# Patient Record
Sex: FEMALE
[De-identification: names, ages, dates, MRNs, and addresses within clinical notes are randomized per-mention and may not be internally consistent; named-entity substitution may affect disease eponyms.]

---

## 2019-01-04 ENCOUNTER — HOSPITAL ENCOUNTER (INPATIENT)
Dept: HOSPITAL 31 - C.ER | Age: 64
LOS: 6 days | Discharge: HOME | DRG: 190 | End: 2019-01-10
Attending: INTERNAL MEDICINE | Admitting: INTERNAL MEDICINE
Payer: MEDICARE

## 2019-01-04 DIAGNOSIS — J18.9: ICD-10-CM

## 2019-01-04 DIAGNOSIS — J20.9: ICD-10-CM

## 2019-01-04 DIAGNOSIS — J44.1: ICD-10-CM

## 2019-01-04 DIAGNOSIS — E11.9: ICD-10-CM

## 2019-01-04 DIAGNOSIS — F17.210: ICD-10-CM

## 2019-01-04 DIAGNOSIS — J44.0: Primary | ICD-10-CM

## 2019-01-04 DIAGNOSIS — I10: ICD-10-CM

## 2019-01-04 DIAGNOSIS — J45.901: ICD-10-CM

## 2019-01-04 LAB
ALBUMIN SERPL-MCNC: 3.9 G/DL (ref 3.5–5)
ALBUMIN/GLOB SERPL: 1.2 {RATIO} (ref 1–2.1)
ALT SERPL-CCNC: 21 U/L (ref 9–52)
APTT BLD: 47 SECONDS (ref 21–34)
AST SERPL-CCNC: 19 U/L (ref 14–36)
BASE EXCESS BLDV CALC-SCNC: -6.2 MMOL/L (ref 0–2)
BASOPHILS # BLD AUTO: 0.1 K/UL (ref 0–0.2)
BASOPHILS NFR BLD: 0.4 % (ref 0–2)
BNP SERPL-MCNC: 258 PG/ML (ref 0–900)
BUN SERPL-MCNC: 17 MG/DL (ref 7–17)
CALCIUM SERPL-MCNC: 8.8 MG/DL (ref 8.6–10.4)
EOSINOPHIL # BLD AUTO: 0 K/UL (ref 0–0.7)
EOSINOPHIL NFR BLD: 0.1 % (ref 0–4)
ERYTHROCYTE [DISTWIDTH] IN BLOOD BY AUTOMATED COUNT: 13.6 % (ref 11.5–14.5)
GFR NON-AFRICAN AMERICAN: > 60
HGB BLD-MCNC: 14.9 G/DL (ref 11–16)
INR PPP: 1.4
LYMPHOCYTE: 9 % (ref 20–40)
LYMPHOCYTES # BLD AUTO: 0.8 K/UL (ref 1–4.3)
LYMPHOCYTES NFR BLD AUTO: 5.2 % (ref 20–40)
MCH RBC QN AUTO: 28.6 PG (ref 27–31)
MCHC RBC AUTO-ENTMCNC: 33.2 G/DL (ref 33–37)
MCV RBC AUTO: 86.3 FL (ref 81–99)
MONOCYTE: 4 % (ref 0–10)
MONOCYTES # BLD: 1.2 K/UL (ref 0–0.8)
MONOCYTES NFR BLD: 7.5 % (ref 0–10)
NEUTROPHILS # BLD: 13.6 K/UL (ref 1.8–7)
NEUTROPHILS NFR BLD AUTO: 84 % (ref 50–75)
NEUTROPHILS NFR BLD AUTO: 86.8 % (ref 50–75)
NEUTS BAND NFR BLD: 3 % (ref 0–2)
NRBC BLD AUTO-RTO: 0 % (ref 0–2)
PCO2 BLDV: 30 MMHG (ref 40–60)
PH BLDV: 7.38 [PH] (ref 7.32–7.43)
PLATELET # BLD EST: NORMAL 10*3/UL
PLATELET # BLD: 205 K/UL (ref 130–400)
PMV BLD AUTO: 9.7 FL (ref 7.2–11.7)
PROTHROMBIN TIME: 15.1 SECONDS (ref 9.7–12.2)
RBC # BLD AUTO: 5.19 MIL/UL (ref 3.8–5.2)
TOTAL CELLS COUNTED BLD: 100
VENOUS BLOOD FIO2: 21 %
VENOUS BLOOD GAS PO2: 47 MM/HG (ref 30–55)
WBC # BLD AUTO: 15.7 K/UL (ref 4.8–10.8)

## 2019-01-04 RX ADMIN — METHYLPREDNISOLONE SODIUM SUCCINATE SCH MG: 40 INJECTION, POWDER, FOR SOLUTION INTRAMUSCULAR; INTRAVENOUS at 23:47

## 2019-01-04 NOTE — RAD
HISTORY:

 SOB 



COMPARISON:

Chest x-ray performed 3/30/15 



TECHNIQUE:

Chest, one view.



FINDINGS:





LUNGS:

Patchy right greater than left lower lobe infiltrates.



Please note that chest x-ray has limited sensitivity for the 

detection of pulmonary masses.



PLEURA:

No significant pleural effusion identified. No definite pneumothorax .



CARDIOVASCULAR:

Heart size appears within normal limits.  Atherosclerotic 

calcifications present. 



OSSEOUS STRUCTURES:

Degenerative changes.



VISUALIZED UPPER ABDOMEN:

Unremarkable.



OTHER FINDINGS:

None.



IMPRESSION:

Patchy right greater than left lower lobe infiltrate.

## 2019-01-04 NOTE — C.PDOC
History Of Present Illness


63 year old female presents to the ED c/o fever, cough, congestion and dyspnea 

on exertion for the past 2-3 days. Patient also reports having decreased 

appetite able to speak in complete sentences. Patient states she still smokes 

3/4 pack a day. Patient denies rash, headache, nausea, vomit, diarrhea, CP, 

weakness, numbness.


Time Seen by Provider: 01/04/19 19:51


Chief Complaint (Nursing): Fever


History Per: Patient


History/Exam Limitations: no limitations


Onset/Duration Of Symptoms: Days (2-3)


Current Symptoms Are (Timing): Still Present


Location Of Pain: Sinus/es


Associated Symptoms: Fever, Cough, Sinus Drainage, Nasal Congestion


Severity: Moderate


Pain Scale Rating Of: 4


Recent travel outside of the [place]: No


Additional History Per: Patient





Past Medical History


Reviewed: Historical Data, Nursing Documentation, Vital Signs


Vital Signs: 





                                Last Vital Signs











Temp  100.9 F H  01/04/19 18:25


 


Pulse  98 H  01/04/19 18:25


 


Resp  24   01/04/19 18:25


 


BP  121/71   01/04/19 18:25


 


Pulse Ox  91 L  01/04/19 18:25














- Medical History


PMH: Asthma, HTN


Surgical History: No Surg Hx


Family History: States: Unknown Family Hx





- Social History


Hx Alcohol Use: No


Hx Substance Use: No





- Immunization History


Hx Tetanus Toxoid Vaccination: No


Hx Influenza Vaccination: No


Hx Pneumococcal Vaccination: No





Review Of Systems


Constitutional: Positive for: Fever.  Negative for: Chills


ENT: Positive for: Nose Discharge, Nose Congestion


Cardiovascular: Negative for: Chest Pain


Respiratory: Positive for: Cough, SOB with Excertion.  Negative for: Shortness 

of Breath


Gastrointestinal: Negative for: Nausea, Vomiting, Abdominal Pain, Diarrhea


Musculoskeletal: Negative for: Back Pain


Skin: Negative for: Rash


Neurological: Negative for: Weakness, Numbness, Headache, Dizziness


Psych: Negative for: Anxiety





Physical Exam





- Physical Exam


Appears: Non-toxic, No Acute Distress


Skin: Warm, Dry


Head: Normacephalic


Eye(s): bilateral: Normal Inspection


Oral Mucosa: Moist


Throat: No Erythema, No Exudate


Neck: Supple


Chest: Symmetrical


Cardiovascular: Rhythm Regular


Respiratory: Decreased Breath Sounds, No Rales, Rhonchi, Wheezing


Gastrointestinal/Abdominal: Soft, No Tenderness, No Guarding, No Rebound


Back: Normal Inspection


Extremity: Normal ROM


Extremity: Bilateral: Atraumatic, Normal Color And Temperature, Normal ROM


Neurological/Psych: Oriented x3, Normal Speech, Normal Cognition


Gait: Steady





ED Course And Treatment





- Laboratory Results


Result Diagrams: 


                                 01/04/19 20:33





                                 01/04/19 20:33


ECG: Interpreted By Me, Viewed By Me


ECG Rhythm: Sinus Rhythm (68), L BBB, Nonspecific Changes


O2 Sat by Pulse Oximetry: 91


Pulse Ox Interpretation: Abnormal


Interpretation Of Abnormal: placed on 2l nc





- Radiology


CXR: Interpreted by Me, Viewed By Me


CXR Interpretation: Yes: Infiltrates (rll?), COPD, Other (unchanged from 2015). 

No: Fracture, Pnemothorax


Progress Note: Plan:  - VBG.  - EKG.  - CXR.  - Labs.  - IV fluids.  - Tylenol 

975 mg PO.  - Blood culture.  - Influenza A B.  - UA





Disposition


Discussed With : Malachi Pena


Comment: accepted the pt on his service and took over the  care at 11:02PM


Doctor Will See Patient In The: Hospital


Counseled Patient/Family Regarding: Studies Performed, Diagnosis





- Disposition


Disposition: ELOPEMENT - ER ONLY


Disposition Time: 19:51


Condition: FAIR


Forms:  [facility] (English)





- POA


Present On Arrival: Poor Glycemic Control





- Clinical Impression


Clinical Impression: 


 Fever, Pneumonia








- Scribe Statement


The provider has reviewed the documentation as recorded by the Scribe


Anthony Steele





All medical record entries made by the Scribe were at my direction and 

personally dictated by me. I have reviewed the chart and agree that the record 

accurately reflects my personal performance of the history, physical exam, 

medical decision making, and the department course for this patient. I have also

personally directed, reviewed, and agree with the discharge instructions and 

disposition.





Decision To Admit





- Pt Status Changed To:


Hospital Disposition Of: Inpatient





- Admit Certification


Admit to Inpatient:: After my assessment, the patient will require 

hospitalization for at least two midnights.  This is because of the severity of 

symptoms shown, intensity of services needed, and/or the medical risk in this 

patient being treated as an outpatient.





- InPatient:


Physician Admission Certification:: After my assessment, the patient will 

require hospitalization for at least two midnights.  This is because of the 

severity of symptoms shown, intensity of services needed, and/or the medical 

risk in this patient being treated as an outpatient.





- .


Bed Request Type: Regular


Admitting Physician: Malachi Pena


Patient Diagnosis: 


 Fever, Pneumonia

## 2019-01-05 LAB
ALBUMIN SERPL-MCNC: 3.6 G/DL (ref 3.5–5)
ALBUMIN/GLOB SERPL: 1.1 {RATIO} (ref 1–2.1)
ALT SERPL-CCNC: 17 U/L (ref 9–52)
AST SERPL-CCNC: 30 U/L (ref 14–36)
BASOPHILS # BLD AUTO: 0 K/UL (ref 0–0.2)
BASOPHILS NFR BLD: 0.2 % (ref 0–2)
BILIRUB UR-MCNC: NEGATIVE MG/DL
BUN SERPL-MCNC: 17 MG/DL (ref 7–17)
BURR CELLS BLD QL SMEAR: SLIGHT
CALCIUM SERPL-MCNC: 8.6 MG/DL (ref 8.6–10.4)
EOSINOPHIL # BLD AUTO: 0 K/UL (ref 0–0.7)
EOSINOPHIL NFR BLD: 0.2 % (ref 0–4)
ERYTHROCYTE [DISTWIDTH] IN BLOOD BY AUTOMATED COUNT: 13.5 % (ref 11.5–14.5)
GFR NON-AFRICAN AMERICAN: > 60
GLUCOSE UR STRIP-MCNC: (no result) MG/DL
HGB BLD-MCNC: 14.5 G/DL (ref 11–16)
LEUKOCYTE ESTERASE UR-ACNC: (no result) LEU/UL
LG PLATELETS BLD QL SMEAR: PRESENT
LYMPHOCYTE: 3 % (ref 20–40)
LYMPHOCYTES # BLD AUTO: 0.6 K/UL (ref 1–4.3)
LYMPHOCYTES NFR BLD AUTO: 3.6 % (ref 20–40)
MCH RBC QN AUTO: 28.6 PG (ref 27–31)
MCHC RBC AUTO-ENTMCNC: 32.5 G/DL (ref 33–37)
MCV RBC AUTO: 88 FL (ref 81–99)
MONOCYTE: 1 % (ref 0–10)
MONOCYTES # BLD: 0.1 K/UL (ref 0–0.8)
MONOCYTES NFR BLD: 0.9 % (ref 0–10)
NEUTROPHILS # BLD: 15.4 K/UL (ref 1.8–7)
NEUTROPHILS NFR BLD AUTO: 87 % (ref 50–75)
NEUTROPHILS NFR BLD AUTO: 95.1 % (ref 50–75)
NEUTS BAND NFR BLD: 8 % (ref 0–2)
NRBC BLD AUTO-RTO: 0 % (ref 0–2)
PH UR STRIP: 5 [PH] (ref 5–8)
PLATELET # BLD EST: NORMAL 10*3/UL
PLATELET # BLD: 180 K/UL (ref 130–400)
PMV BLD AUTO: 9.6 FL (ref 7.2–11.7)
PROT UR STRIP-MCNC: NEGATIVE MG/DL
RBC # BLD AUTO: 5.07 MIL/UL (ref 3.8–5.2)
RBC # UR STRIP: NEGATIVE /UL
SP GR UR STRIP: 1.02 (ref 1–1.03)
TOTAL CELLS COUNTED BLD: 100
UROBILINOGEN UR-MCNC: NORMAL MG/DL (ref 0.2–1)
VARIANT LYMPHS NFR BLD MANUAL: 1 % (ref 0–0)
WBC # BLD AUTO: 16.1 K/UL (ref 4.8–10.8)

## 2019-01-05 RX ADMIN — HUMAN INSULIN SCH UNIT: 100 INJECTION, SOLUTION SUBCUTANEOUS at 21:47

## 2019-01-05 RX ADMIN — METHYLPREDNISOLONE SODIUM SUCCINATE SCH MG: 40 INJECTION, POWDER, FOR SOLUTION INTRAMUSCULAR; INTRAVENOUS at 23:11

## 2019-01-05 RX ADMIN — METHYLPREDNISOLONE SODIUM SUCCINATE SCH MG: 40 INJECTION, POWDER, FOR SOLUTION INTRAMUSCULAR; INTRAVENOUS at 08:30

## 2019-01-05 RX ADMIN — IPRATROPIUM BROMIDE AND ALBUTEROL SULFATE SCH: .5; 3 SOLUTION RESPIRATORY (INHALATION) at 21:09

## 2019-01-05 RX ADMIN — IPRATROPIUM BROMIDE AND ALBUTEROL SULFATE PRN ML: .5; 3 SOLUTION RESPIRATORY (INHALATION) at 05:22

## 2019-01-05 RX ADMIN — METHYLPREDNISOLONE SODIUM SUCCINATE SCH MG: 40 INJECTION, POWDER, FOR SOLUTION INTRAMUSCULAR; INTRAVENOUS at 16:08

## 2019-01-05 RX ADMIN — IPRATROPIUM BROMIDE AND ALBUTEROL SULFATE PRN ML: .5; 3 SOLUTION RESPIRATORY (INHALATION) at 12:37

## 2019-01-05 NOTE — CP.PCM.HP
History of Present Illness





- History of Present Illness


History of Present Illness: 





63 year old female presents to the ED c/o fever, cough, congestion and dyspnea 

on exertion for the past 2-3 days. Patient also reports having decreased 

appetite able to speak in complete sentences. Patient states she still smokes 

3/4 pack a day





Present on Admission





- Present on Admission


Any Indicators Present on Admission: No





Review of Systems





- Review of Systems


All systems: reviewed and no additional remarkable complaints except (COUGH 

WHEEZING SHORTNESS OF AND LOW-GRADE FEVER)





Past Patient History





- Past Social History


Smoking Status: Light Smoker < 10 Cigarettes Daily





- CARDIAC


Hx Hypertension: Yes





- PULMONARY


Hx Asthma: Yes





- ENDOCRINE/METABOLIC


Hx Endocrine Disorders: Yes


Hx Diabetes Mellitus Type 2: Yes





- PSYCHIATRIC


Hx Substance Use: No





- SURGICAL HISTORY


Hx Surgeries: Yes


Hx Cataract Extraction: Yes (left)





Meds


Allergies/Adverse Reactions: 


                                    Allergies











Allergy/AdvReac Type Severity Reaction Status Date / Time


 


No Known Allergies Allergy   Verified 01/04/19 19:18














Physical Exam





- Constitutional


Appears: Well





- Head Exam


Head Exam: ATRAUMATIC, NORMAL INSPECTION, NORMOCEPHALIC





- Eye Exam


Eye Exam: EOMI, Normal appearance, PERRL





- ENT Exam


ENT Exam: Mucous Membranes Moist, Normal Exam





- Neck Exam


Neck exam: Positive for: Normal Inspection





- Respiratory Exam


Respiratory Exam: Accessory Muscle Use, Prolonged Expiratory Phase, Rhonchi





- Cardiovascular Exam


Cardiovascular Exam: REGULAR RHYTHM





- GI/Abdominal Exam


GI & Abdominal Exam: Normal Bowel Sounds, Soft.  absent: Tenderness





- Extremities Exam


Extremities exam: Positive for: normal inspection





Results





- Vital Signs


Recent Vital Signs: 





                                Last Vital Signs











Temp  98.3 F   01/04/19 23:47


 


Pulse  88   01/05/19 12:05


 


Resp  16   01/05/19 12:05


 


BP  115/56 L  01/05/19 12:05


 


Pulse Ox  95   01/05/19 12:05














- Labs


Result Diagrams: 


                                 01/05/19 05:13





                                 01/05/19 05:42


Labs: 





                         Laboratory Results - last 24 hr











  01/04/19 01/04/19 01/04/19





  20:33 20:33 20:33


 


WBC  15.7 H  


 


RBC  5.19  


 


Hgb  14.9  


 


Hct  44.8  


 


MCV  86.3  


 


MCH  28.6  


 


MCHC  33.2  


 


RDW  13.6  


 


Plt Count  205  


 


MPV  9.7  


 


Neut % (Auto)  86.8 H  


 


Lymph % (Auto)  5.2 L  


 


Mono % (Auto)  7.5  


 


Eos % (Auto)  0.1  


 


Baso % (Auto)  0.4  


 


Neut # (Auto)  13.6 H  


 


Lymph # (Auto)  0.8 L  


 


Mono # (Auto)  1.2 H  


 


Eos # (Auto)  0.0  


 


Baso # (Auto)  0.1  


 


Neutrophils % (Manual)  84 H  


 


Band Neutrophils %  3 H  


 


Lymphocytes % (Manual)  9 L  


 


Reactive Lymphs %   


 


Monocytes % (Manual)  4  


 


Platelet Estimate  Normal  


 


Large Platelets   


 


Harjeet Cells   


 


PT   15.1 H 


 


INR   1.4 


 


APTT   47 H 


 


pO2   


 


VBG pH   


 


VBG pCO2   


 


VBG HCO3   


 


VBG Total CO2   


 


VBG O2 Sat (Calc)   


 


VBG Base Excess   


 


VBG Potassium   


 


A-a O2 Difference   


 


Glucose   


 


Lactate   


 


FiO2   


 


Crit Value Called To   


 


Crit Value Called By   


 


Crit Value Read Back   


 


Blood Gas Notified Time   


 


Sodium    134


 


Potassium    3.9


 


Chloride    99


 


Carbon Dioxide    25


 


Anion Gap    15


 


BUN    17


 


Creatinine    0.8


 


Est GFR ( Amer)    > 60


 


Est GFR (Non-Af Amer)    > 60


 


Random Glucose    154 H D


 


Calcium    8.8


 


Total Bilirubin    0.8


 


AST    19


 


ALT    21


 


Alkaline Phosphatase    111


 


Troponin I    < 0.0120


 


NT-Pro-B Natriuret Pep    258


 


Total Protein    7.1


 


Albumin    3.9


 


Globulin    3.3


 


Albumin/Globulin Ratio    1.2


 


Venous Blood Potassium   


 


Influenza Typ A,B (EIA)   














  01/04/19 01/04/19 01/05/19





  20:33 20:38 05:13


 


WBC    16.1 H


 


RBC    5.07


 


Hgb    14.5


 


Hct    44.6


 


MCV    88.0


 


MCH    28.6


 


MCHC    32.5 L


 


RDW    13.5


 


Plt Count    180


 


MPV    9.6


 


Neut % (Auto)    95.1 H


 


Lymph % (Auto)    3.6 L


 


Mono % (Auto)    0.9


 


Eos % (Auto)    0.2


 


Baso % (Auto)    0.2


 


Neut # (Auto)    15.4 H


 


Lymph # (Auto)    0.6 L


 


Mono # (Auto)    0.1


 


Eos # (Auto)    0.0


 


Baso # (Auto)    0.0


 


Neutrophils % (Manual)    87 H


 


Band Neutrophils %    8 H


 


Lymphocytes % (Manual)    3 L


 


Reactive Lymphs %    1 H


 


Monocytes % (Manual)    1


 


Platelet Estimate    Normal


 


Large Platelets    Present


 


[place] Cells    Slight


 


PT   


 


INR   


 


APTT   


 


pO2   47 


 


VBG pH   7.38 


 


VBG pCO2   30 L 


 


VBG HCO3   19.7 


 


VBG Total CO2   18.6 L 


 


VBG O2 Sat (Calc)   91.2 H 


 


VBG Base Excess   -6.2 L 


 


VBG Potassium   2.4 L* 


 


A-a O2 Difference   65.0 


 


Glucose   116 H 


 


Lactate   1.0 


 


FiO2   21.0 


 


Crit Value Called To   [place] 


 


Crit Value Called By    


 


Crit Value Read Back   Y 


 


Blood Gas Notified Time   2041 


 


Sodium   143.0 


 


Potassium   


 


Chloride   114.0 H 


 


Carbon Dioxide   


 


Anion Gap   


 


BUN   


 


Creatinine   


 


Est GFR ( Amer)   


 


Est GFR (Non-Af Amer)   


 


Random Glucose   


 


Calcium   


 


Total Bilirubin   


 


AST   


 


ALT   


 


Alkaline Phosphatase   


 


Troponin I   


 


NT-Pro-B Natriuret Pep   


 


Total Protein   


 


Albumin   


 


Globulin   


 


Albumin/Globulin Ratio   


 


Venous Blood Potassium   2.4 L* 


 


Influenza Typ A,B (EIA)  Negative for flu a/b  














  01/05/19





  05:42


 


WBC 


 


RBC 


 


Hgb 


 


Hct 


 


MCV 


 


MCH 


 


MCHC 


 


RDW 


 


Plt Count 


 


MPV 


 


Neut % (Auto) 


 


Lymph % (Auto) 


 


Mono % (Auto) 


 


Eos % (Auto) 


 


Baso % (Auto) 


 


Neut # (Auto) 


 


Lymph # (Auto) 


 


Mono # (Auto) 


 


Eos # (Auto) 


 


Baso # (Auto) 


 


Neutrophils % (Manual) 


 


Band Neutrophils % 


 


Lymphocytes % (Manual) 


 


Reactive Lymphs % 


 


Monocytes % (Manual) 


 


Platelet Estimate 


 


Large Platelets 


 


Harjeet Cells 


 


PT 


 


INR 


 


APTT 


 


pO2 


 


VBG pH 


 


VBG pCO2 


 


VBG HCO3 


 


VBG Total CO2 


 


VBG O2 Sat (Calc) 


 


VBG Base Excess 


 


VBG Potassium 


 


A-a O2 Difference 


 


Glucose 


 


Lactate 


 


FiO2 


 


Crit Value Called To 


 


Crit Value Called By 


 


Crit Value Read Back 


 


Blood Gas Notified Time 


 


Sodium  139


 


Potassium  4.5


 


Chloride  104


 


Carbon Dioxide  22


 


Anion Gap  17


 


BUN  17


 


Creatinine  0.6 L


 


Est GFR ( Amer)  > 60


 


Est GFR (Non-Af Amer)  > 60


 


Random Glucose  137 H


 


Calcium  8.6


 


Total Bilirubin  0.7


 


AST  30


 


ALT  17


 


Alkaline Phosphatase  110


 


Troponin I 


 


NT-Pro-B Natriuret Pep 


 


Total Protein  6.8


 


Albumin  3.6


 


Globulin  3.2


 


Albumin/Globulin Ratio  1.1


 


Venous Blood Potassium 


 


Influenza Typ A,B (EIA) 














Assessment & Plan


(1) COPD (chronic obstructive pulmonary disease) with acute bronchitis


Status: Acute   





(2) Pneumonia


Status: Acute

## 2019-01-05 NOTE — CP.PCM.CON
History of Present Illness





- History of Present Illness


History of Present Illness: 


INFECTIOUS DISEASE CONSULT;


HPI;


63 year old female,with history of asthma, hypertension, NIDDM who presented to 

the ED on 1/4/18 c/o fever, cough, congestion and dyspnea on exertion for the 

past 2-3 days. patient also complains of productive cough with dark greenish 

phlegm for the past few days.  Patient has difficulty expectorating phlegm and 

feels nasal congestion and fullness.


Patient also reports having decreased appetite able to speak in complete 

sentences. 





Patient states she still smokes 3/4 pack a day. Patient denies rash, headache, 

nausea, vomit, diarrhea, CP, weakness, numbness.


chest x-ray on admission showed patchy right-sided more than left lower lob 

infiltrates.


Rapid Influenza A and B Ag negative.  Patient also had leukocytosis.


Patient was appropriately cultured and started on IV Rocephin 1 g once a day 

daily..


INFECTIOUS DISEASE CONSULT REQUESTED FOR PNEUMONIA AND EXACERBATION OF BRONCHIAL

ASTHMA.








PMH: Asthma, HTN, DM-2


Surgical History: No Surg Hx


Family History: States: Unknown Family Hx





- Social History


Hx Alcohol Use: No


Hx Substance Use: No





- Immunization History


Hx Tetanus Toxoid Vaccination: No


Hx Influenza Vaccination: No


Hx Pneumococcal Vaccination: No





ALLERGY : NKA





Review of Systems





- Constitutional


Constitutional: Fever.  absent: Chills





- EENT


Eyes: absent: Change in Vision


Ears: absent: Ear Pain


Nose/Mouth/Throat: Nasal Congestion, Nasal Obstruction.  absent: Sinus Pain, 

Mouth Lesions





- Cardiovascular


Cardiovascular: Dyspnea on Exertion.  absent: Chest Pain





- Respiratory


Respiratory: Cough, Wheezing, Change in Mucous Color.  absent: Hemoptysis





- Gastrointestinal


Gastrointestinal: absent: Abdominal Pain, Diarrhea, Nausea, Odynophagia, 

Vomiting





- Genitourinary


Genitourinary: absent: Dysuria, Urinary Hesitance, Freq UTI





- Musculoskeletal


Musculoskeletal: absent: Myalgias





- Neurological


Neurological: absent: Headaches





- Hematologic/Lymphatic


Hematologic: As Per HPI.  absent: Easy Bleeding, Easy Bruising





Past Patient History





- Past Social History


Smoking Status: Light Smoker < 10 Cigarettes Daily





- CARDIAC


Hx Hypertension: Yes





- PULMONARY


Hx Asthma: Yes





- ENDOCRINE/METABOLIC


Hx Endocrine Disorders: Yes


Hx Diabetes Mellitus Type 2: Yes





- PSYCHIATRIC


Hx Substance Use: No





- SURGICAL HISTORY


Hx Surgeries: Yes


Hx Cataract Extraction: Yes (left)





Meds


Allergies/Adverse Reactions: 


                                    Allergies











Allergy/AdvReac Type Severity Reaction Status Date / Time


 


No Known Allergies Allergy   Verified 01/04/19 19:18














- Medications


Medications: 


                               Current Medications





Albuterol/Ipratropium (Duoneb 3 Mg/0.5 Mg (3 Ml) Ud)  3 ml INH RQ4 PRN


   PRN Reason: Shortness of Breath


   Last Admin: 01/05/19 12:37 Dose:  3 ml


Heparin Sodium (Porcine) (Heparin)  5,000 units SC Q12 SHARON


Ceftriaxone Sodium (Rocephin Iv 1 Gm Duplex)  50 mls @ 150 mls/hr IVPB DAILY 

SHARON; Protocol


   Last Admin: 01/05/19 12:10 Dose:  150 mls/hr


Methylprednisolone (Solu-Medrol)  40 mg IVP Q8H SHARON


   Last Admin: 01/05/19 16:08 Dose:  40 mg











Physical Exam





- Constitutional


Appears: No Acute Distress





- Head Exam


Head Exam: NORMAL INSPECTION





- Eye Exam


Eye Exam: EOMI, PERRL





- ENT Exam


ENT Exam: Normal Oropharynx





- Neck Exam


Neck exam: Positive for: Normal Inspection





- Respiratory Exam


Respiratory Exam: Prolonged Expiratory Phase, Rhonchi (BIBASILAR R>L)





- Cardiovascular Exam


Cardiovascular Exam: REGULAR RHYTHM, +S1, +S2





- GI/Abdominal Exam


GI & Abdominal Exam: Normal Bowel Sounds, Soft.  absent: Organomegaly





- Extremities Exam


Extremities exam: Positive for: normal capillary refill, pedal pulses present.  

Negative for: calf tenderness, pedal edema





- Neurological Exam


Neurological exam: Alert, CN II-XII Intact, Normal Gait, Oriented x3, Reflexes 

Normal





- Psychiatric Exam


Psychiatric exam: Normal Mood





- Skin


Skin Exam: Normal Color, Warm





Results





- Vital Signs


Recent Vital Signs: 


                                Last Vital Signs











Temp  98.8 F   01/05/19 14:50


 


Pulse  63   01/05/19 14:50


 


Resp  17   01/05/19 14:50


 


BP  112/51 L  01/05/19 14:50


 


Pulse Ox  96   01/05/19 14:50














- Labs


Result Diagrams: 


                                 01/05/19 05:13





                                 01/05/19 05:42


Labs: 


                         Laboratory Results - last 24 hr











  01/04/19 01/04/19 01/04/19





  20:33 20:33 20:33


 


WBC  15.7 H  


 


RBC  5.19  


 


Hgb  14.9  


 


Hct  44.8  


 


MCV  86.3  


 


MCH  28.6  


 


MCHC  33.2  


 


RDW  13.6  


 


Plt Count  205  


 


MPV  9.7  


 


Neut % (Auto)  86.8 H  


 


Lymph % (Auto)  5.2 L  


 


Mono % (Auto)  7.5  


 


Eos % (Auto)  0.1  


 


Baso % (Auto)  0.4  


 


Neut # (Auto)  13.6 H  


 


Lymph # (Auto)  0.8 L  


 


Mono # (Auto)  1.2 H  


 


Eos # (Auto)  0.0  


 


Baso # (Auto)  0.1  


 


Neutrophils % (Manual)  84 H  


 


Band Neutrophils %  3 H  


 


Lymphocytes % (Manual)  9 L  


 


Reactive Lymphs %   


 


Monocytes % (Manual)  4  


 


Platelet Estimate  Normal  


 


Large Platelets   


 


[place] Cells   


 


PT   15.1 H 


 


INR   1.4 


 


APTT   47 H 


 


pO2   


 


VBG pH   


 


VBG pCO2   


 


VBG HCO3   


 


VBG Total CO2   


 


VBG O2 Sat (Calc)   


 


VBG Base Excess   


 


VBG Potassium   


 


A-a O2 Difference   


 


Glucose   


 


Lactate   


 


FiO2   


 


Crit Value Called To   


 


Crit Value Called By   


 


Crit Value Read Back   


 


Blood Gas Notified Time   


 


Sodium    134


 


Potassium    3.9


 


Chloride    99


 


Carbon Dioxide    25


 


Anion Gap    15


 


BUN    17


 


Creatinine    0.8


 


Est GFR ( Amer)    > 60


 


Est GFR (Non-Af Amer)    > 60


 


POC Glucose (mg/dL)   


 


Random Glucose    154 H D


 


Calcium    8.8


 


Total Bilirubin    0.8


 


AST    19


 


ALT    21


 


Alkaline Phosphatase    111


 


Troponin I    < 0.0120


 


NT-Pro-B Natriuret Pep    258


 


Total Protein    7.1


 


Albumin    3.9


 


Globulin    3.3


 


Albumin/Globulin Ratio    1.2


 


Venous Blood Potassium   


 


Urine Color   


 


Urine Clarity   


 


Urine pH   


 


Ur Specific Gravity   


 


Urine Protein   


 


Urine Glucose (UA)   


 


Urine Ketones   


 


Urine Blood   


 


Urine Nitrate   


 


Urine Bilirubin   


 


Urine Urobilinogen   


 


Ur Leukocyte Esterase   


 


Urine WBC (Auto)   


 


Urine RBC (Auto)   


 


Influenza Typ A,B (EIA)   














  01/04/19 01/04/19 01/05/19





  20:33 20:38 05:13


 


WBC    16.1 H


 


RBC    5.07


 


Hgb    14.5


 


Hct    44.6


 


MCV    88.0


 


MCH    28.6


 


MCHC    32.5 L


 


RDW    13.5


 


Plt Count    180


 


MPV    9.6


 


Neut % (Auto)    95.1 H


 


Lymph % (Auto)    3.6 L


 


Mono % (Auto)    0.9


 


Eos % (Auto)    0.2


 


Baso % (Auto)    0.2


 


Neut # (Auto)    15.4 H


 


Lymph # (Auto)    0.6 L


 


Mono # (Auto)    0.1


 


Eos # (Auto)    0.0


 


Baso # (Auto)    0.0


 


Neutrophils % (Manual)    87 H


 


Band Neutrophils %    8 H


 


Lymphocytes % (Manual)    3 L


 


Reactive Lymphs %    1 H


 


Monocytes % (Manual)    1


 


Platelet Estimate    Normal


 


Large Platelets    Present


 


Harjeet Cells    Slight


 


PT   


 


INR   


 


APTT   


 


pO2   47 


 


VBG pH   7.38 


 


VBG pCO2   30 L 


 


VBG HCO3   19.7 


 


VBG Total CO2   18.6 L 


 


VBG O2 Sat (Calc)   91.2 H 


 


VBG Base Excess   -6.2 L 


 


VBG Potassium   2.4 L* 


 


A-a O2 Difference   65.0 


 


Glucose   116 H 


 


Lactate   1.0 


 


FiO2   21.0 


 


Crit Value Called To   S[place]ira 


 


Crit Value Called By    


 


Crit Value Read Back   Y 


 


Blood Gas Notified Time   2041 


 


Sodium   143.0 


 


Potassium   


 


Chloride   114.0 H 


 


Carbon Dioxide   


 


Anion Gap   


 


BUN   


 


Creatinine   


 


Est GFR ( Amer)   


 


Est GFR (Non-Af Amer)   


 


POC Glucose (mg/dL)   


 


Random Glucose   


 


Calcium   


 


Total Bilirubin   


 


AST   


 


ALT   


 


Alkaline Phosphatase   


 


Troponin I   


 


NT-Pro-B Natriuret Pep   


 


Total Protein   


 


Albumin   


 


Globulin   


 


Albumin/Globulin Ratio   


 


Venous Blood Potassium   2.4 L* 


 


Urine Color   


 


Urine Clarity   


 


Urine pH   


 


Ur Specific Gravity   


 


Urine Protein   


 


Urine Glucose (UA)   


 


Urine Ketones   


 


Urine Blood   


 


Urine Nitrate   


 


Urine Bilirubin   


 


Urine Urobilinogen   


 


Ur Leukocyte Esterase   


 


Urine WBC (Auto)   


 


Urine RBC (Auto)   


 


Influenza Typ A,B (EIA)  Negative for flu a/b  














  01/05/19 01/05/19 01/05/19





  05:42 14:50 16:37


 


WBC   


 


RBC   


 


Hgb   


 


Hct   


 


MCV   


 


MCH   


 


MCHC   


 


RDW   


 


Plt Count   


 


MPV   


 


Neut % (Auto)   


 


Lymph % (Auto)   


 


Mono % (Auto)   


 


Eos % (Auto)   


 


Baso % (Auto)   


 


Neut # (Auto)   


 


Lymph # (Auto)   


 


Mono # (Auto)   


 


Eos # (Auto)   


 


Baso # (Auto)   


 


Neutrophils % (Manual)   


 


Band Neutrophils %   


 


Lymphocytes % (Manual)   


 


Reactive Lymphs %   


 


Monocytes % (Manual)   


 


Platelet Estimate   


 


Large Platelets   


 


Harjeet Cells   


 


PT   


 


INR   


 


APTT   


 


pO2   


 


VBG pH   


 


VBG pCO2   


 


VBG HCO3   


 


VBG Total CO2   


 


VBG O2 Sat (Calc)   


 


VBG Base Excess   


 


VBG Potassium   


 


A-a O2 Difference   


 


Glucose   


 


Lactate   


 


FiO2   


 


Crit Value Called To   


 


Crit Value Called By   


 


Crit Value Read Back   


 


Blood Gas Notified Time   


 


Sodium  139  


 


Potassium  4.5  


 


Chloride  104  


 


Carbon Dioxide  22  


 


Anion Gap  17  


 


BUN  17  


 


Creatinine  0.6 L  


 


Est GFR ( Amer)  > 60  


 


Est GFR (Non-Af Amer)  > 60  


 


POC Glucose (mg/dL)    334 H


 


Random Glucose  137 H  


 


Calcium  8.6  


 


Total Bilirubin  0.7  


 


AST  30  


 


ALT  17  


 


Alkaline Phosphatase  110  


 


Troponin I   


 


NT-Pro-B Natriuret Pep   


 


Total Protein  6.8  


 


Albumin  3.6  


 


Globulin  3.2  


 


Albumin/Globulin Ratio  1.1  


 


Venous Blood Potassium   


 


Urine Color   Yellow 


 


Urine Clarity   Clear 


 


Urine pH   5.0 


 


Ur Specific Gravity   1.025 


 


Urine Protein   Negative 


 


Urine Glucose (UA)   3+ H 


 


Urine Ketones   1+ H 


 


Urine Blood   Negative 


 


Urine Nitrate   Negative 


 


Urine Bilirubin   Negative 


 


Urine Urobilinogen   Normal 


 


Ur Leukocyte Esterase   Neg 


 


Urine WBC (Auto)   1 


 


Urine RBC (Auto)   < 1 


 


Influenza Typ A,B (EIA)   














- Imaging and Cardiology


  ** Chest x-ray


Status: Report reviewed by me (SEE REPORT.)





Assessment & Plan


(1) Pneumonia


Status: Acute   





(2) Fever


Status: Acute   





(3) Asthma dependent on inhaled steroids


Status: Acute   





(4) Diabetes mellitus


Status: Acute   





- Assessment and Plan (Free Text)


Plan: 





PLAN;


PANCULTURES


SPUTUM gRAM STAIN AND CULTURE.


AYPICAL TITERS


ESR.


CRP





CONTINUE iv ROCEPHIN 1 G ONCE A DAY DAILY 1/4/19.


ADD iv ZITHROMAX 500 MG IVPB  DAILY  1/5/18.





PATIENT ON iv sOLU-mEDROL AS PER PMD.


dUOnEB TREATMENTS


pULMONARY TOILET.


WE'LL FOLLOW ALONG WITH YOU AND MAKE FURTHER RECOMMENDATIONS  AS NEEDED.


THANK YOU.

## 2019-01-06 VITALS — RESPIRATION RATE: 20 BRPM

## 2019-01-06 LAB
ALBUMIN SERPL-MCNC: 3.5 G/DL (ref 3.5–5)
ALBUMIN/GLOB SERPL: 1.2 {RATIO} (ref 1–2.1)
ALT SERPL-CCNC: 24 U/L (ref 9–52)
AST SERPL-CCNC: 25 U/L (ref 14–36)
BUN SERPL-MCNC: 22 MG/DL (ref 7–17)
CALCIUM SERPL-MCNC: 9.1 MG/DL (ref 8.6–10.4)
GFR NON-AFRICAN AMERICAN: > 60

## 2019-01-06 RX ADMIN — IPRATROPIUM BROMIDE AND ALBUTEROL SULFATE SCH ML: .5; 3 SOLUTION RESPIRATORY (INHALATION) at 16:05

## 2019-01-06 RX ADMIN — IPRATROPIUM BROMIDE AND ALBUTEROL SULFATE SCH: .5; 3 SOLUTION RESPIRATORY (INHALATION) at 11:58

## 2019-01-06 RX ADMIN — IPRATROPIUM BROMIDE AND ALBUTEROL SULFATE SCH ML: .5; 3 SOLUTION RESPIRATORY (INHALATION) at 00:54

## 2019-01-06 RX ADMIN — CEFTRIAXONE SCH MLS/HR: 1 INJECTION, SOLUTION INTRAVENOUS at 09:39

## 2019-01-06 RX ADMIN — IPRATROPIUM BROMIDE AND ALBUTEROL SULFATE SCH ML: .5; 3 SOLUTION RESPIRATORY (INHALATION) at 19:49

## 2019-01-06 RX ADMIN — IPRATROPIUM BROMIDE AND ALBUTEROL SULFATE SCH ML: .5; 3 SOLUTION RESPIRATORY (INHALATION) at 09:36

## 2019-01-06 RX ADMIN — IPRATROPIUM BROMIDE AND ALBUTEROL SULFATE SCH: .5; 3 SOLUTION RESPIRATORY (INHALATION) at 05:03

## 2019-01-06 RX ADMIN — METHYLPREDNISOLONE SODIUM SUCCINATE SCH MG: 40 INJECTION, POWDER, FOR SOLUTION INTRAMUSCULAR; INTRAVENOUS at 06:39

## 2019-01-06 RX ADMIN — METHYLPREDNISOLONE SODIUM SUCCINATE SCH MG: 40 INJECTION, POWDER, FOR SOLUTION INTRAMUSCULAR; INTRAVENOUS at 14:34

## 2019-01-06 RX ADMIN — METHYLPREDNISOLONE SODIUM SUCCINATE SCH MG: 40 INJECTION, POWDER, FOR SOLUTION INTRAMUSCULAR; INTRAVENOUS at 22:51

## 2019-01-06 RX ADMIN — HUMAN INSULIN SCH UNIT: 100 INJECTION, SOLUTION SUBCUTANEOUS at 21:39

## 2019-01-06 RX ADMIN — HUMAN INSULIN SCH UNIT: 100 INJECTION, SOLUTION SUBCUTANEOUS at 17:10

## 2019-01-06 RX ADMIN — HUMAN INSULIN SCH UNIT: 100 INJECTION, SOLUTION SUBCUTANEOUS at 12:27

## 2019-01-06 RX ADMIN — CEFTRIAXONE SCH MLS/HR: 1 INJECTION, SOLUTION INTRAVENOUS at 21:08

## 2019-01-06 RX ADMIN — HUMAN INSULIN SCH UNIT: 100 INJECTION, SOLUTION SUBCUTANEOUS at 08:22

## 2019-01-06 NOTE — CP.PCM.PN
Subjective





- Date & Time of Evaluation


Date of Evaluation: 01/06/19


Time of Evaluation: 15:30





- Subjective


Subjective: 





CHIEF COMPLAINTS TODAY :


Patient is coughing and wheezing with white thick expectoration





ROS.


HEENT :  N.


Resp :       No  hemoptysis 


Cardio :     No anginal  CP, PND, orthopnea, palpitation 


GI :           No abd.pain, n/v ,diarrhea or GI bleeding .


CNS : No headache, vertigo, focal deficit.


Musculoskel :  No joint swelling ,


Derm :        No rash 


Psych :     Normal affect.


Ext :  No  swelling ,calf pain 





PE.


Pt. is alert awake in no distress.


V.S  As noted in the chart 


Head ,ear nose,throat and eyes : Normal.


Neck : Supple with normal carotids.


Lungs: Bilateral basal crackles poor air entry with rhonchi


Heart : S1 & S2 normal with S4. No murmur.


Abd : Soft non tender with normal bowel sounds.


Neuro : Moves all ext. with no localized deficit.


Ext : No edema with intact pulses.Non tender calves 


Derm : No rashes or decubitus ulcer.





LABS/RADIOLOGY:





ASSESSMENT/PLAN : 


Continue IV antibiotic nebulizer and steroids





Objective





- Vital Signs/Intake and Output


Vital Signs (last 24 hours): 


                                        











Temp Pulse Resp BP Pulse Ox


 


 98.1 F   70   20   113/59 L  94 L


 


 01/06/19 08:33  01/06/19 08:33  01/06/19 08:33  01/06/19 08:33  01/06/19 08:33








Intake and Output: 


                                        











 01/06/19 01/06/19





 11:59 23:59


 


Intake Total 200 


 


Balance 200 














- Medications


Medications: 


                               Current Medications





Albuterol/Ipratropium (Duoneb 3 Mg/0.5 Mg (3 Ml) Ud)  3 ml INH RQ4 PRN


   PRN Reason: Shortness of Breath


   Last Admin: 01/05/19 12:37 Dose:  3 ml


Albuterol/Ipratropium (Duoneb 3 Mg/0.5 Mg (3 Ml) Ud)  3 ml INH RQ4 [facility]


   Last Admin: 01/06/19 11:58 Dose:  Not Given


Heparin Sodium (Porcine) (Heparin)  5,000 units SC Q12 [facility]


   Last Admin: 01/06/19 09:39 Dose:  5,000 units


Azithromycin 500 mg/ Sodium (Chloride)  250 mls @ 250 mls/hr IVPB DAILY [facility]; 

Protocol


   Last Admin: 01/06/19 09:40 Dose:  250 mls/hr


Ceftriaxone Sodium (Rocephin Iv 1 Gm Duplex)  50 mls @ 100 mls/hr IVPB Q12H [facility];

Protocol


   Last Admin: 01/06/19 09:39 Dose:  100 mls/hr


Insulin Human Regular (Novolin R)  0 unit SC ACHS [facility]; Protocol


   Last Admin: 01/06/19 12:27 Dose:  4 unit


Lisinopril (Zestril)  10 mg PO DAILY [facility]


   Last Admin: 01/06/19 09:39 Dose:  10 mg


Methylprednisolone (Solu-Medrol)  40 mg IVP Q8H [facility]


   Last Admin: 01/06/19 14:34 Dose:  40 mg


Sitagliptin Phosphate (Januvia)  50 mg PO DAILY [facility]


   Last Admin: 01/06/19 09:39 Dose:  50 mg


Trazodone HCl (Desyrel)  100 mg PO HS [facility]


   Last Admin: 01/05/19 21:34 Dose:  100 mg











- Labs


Labs: 


                                        





                                 01/05/19 05:13 





                                 01/06/19 08:06 





                                        











PT  15.1 SECONDS (9.7-12.2)  H  01/04/19  20:33    


 


INR  1.4   01/04/19  20:33    


 


APTT  47 SECONDS (21-34)  H  01/04/19  20:33    














Assessment and Plan


(1) COPD (chronic obstructive pulmonary disease) with acute bronchitis


Status: Acute   





(2) Pneumonia


Status: Acute

## 2019-01-06 NOTE — CP.PCM.PN
Subjective





- Date & Time of Evaluation


Date of Evaluation: 01/06/19


Time of Evaluation: 23:15





- Subjective


Subjective: 





CHIEF COMPLAINTS TODAY :


AFEBRILE,


C/O COUGH


THICK SECRETIONS.


"i cant cough it out "


on duoneb treatments





ROS.


HEENT :  N.


Resp :       No  hemoptysis +VE COUGH 


Cardio :     No anginal  CP, PND, orthopnea, palpitation 


GI :           No abd.pain, n/v ,diarrhea or GI bleeding .


CNS : No headache, vertigo, focal deficit.


Musculoskel :  No joint swelling ,


Derm :        No rash 


Psych :     Normal affect.


Ext :  No  swelling ,calf pain 





PE.


Pt. is alert awake in no distress.


V.S  As noted in the chart 


Head ,ear nose,throat and eyes : Normal.


Neck : Supple with normal carotids.


Lungs: B/L EXPIRATORY WHEEZE / RHONCHI .


Heart : S1 & S2 normal with S4. No murmur.


Abd : Soft non tender with normal bowel sounds.


Neuro : Moves all ext. with no localized deficit.


Ext : No edema with intact pulses.Non tender calves 


Derm : No rashes or decubitus ulcer.





LABS/RADIOLOGY:


reviewed.


WBC 16.1


ESR 48





ASSESSMENT/PLAN : 


Continue IV antibiotic 


on nebulizer and steroids.


f/u CULTURES TO ADJUST ABX.





Objective





- Vital Signs/Intake and Output


Vital Signs (last 24 hours): 


                                        











Temp Pulse Resp BP Pulse Ox


 


 97.6 F   60   20   120/65   95 


 


 01/06/19 19:25  01/06/19 19:25  01/06/19 19:25  01/06/19 19:25  01/06/19 19:25








Intake and Output: 


                                        











 01/06/19 01/07/19





 18:59 06:59


 


Intake Total 1000 500


 


Balance 1000 500














- Medications


Medications: 


                               Current Medications





Albuterol/Ipratropium (Duoneb 3 Mg/0.5 Mg (3 Ml) Ud)  3 ml INH RQ4 PRN


   PRN Reason: Shortness of Breath


   Last Admin: 01/05/19 12:37 Dose:  3 ml


Albuterol/Ipratropium (Duoneb 3 Mg/0.5 Mg (3 Ml) Ud)  3 ml INH RQ4 [facility]


   Last Admin: 01/06/19 19:49 Dose:  3 ml


Heparin Sodium (Porcine) (Heparin)  5,000 units SC Q12 [facility]


   Last Admin: 01/06/19 21:36 Dose:  5,000 units


Azithromycin 500 mg/ Sodium (Chloride)  250 mls @ 250 mls/hr IVPB DAILY [facility]; 

Protocol


   Last Admin: 01/06/19 09:40 Dose:  250 mls/hr


Ceftriaxone Sodium (Rocephin Iv 1 Gm Duplex)  50 mls @ 100 mls/hr IVPB Q12H [facility];

Protocol


   Last Admin: 01/06/19 21:08 Dose:  100 mls/hr


Insulin Human Regular (Novolin R)  0 unit SC ACHS [facility]; Protocol


   Last Admin: 01/06/19 21:39 Dose:  2 unit


Lisinopril (Zestril)  10 mg PO DAILY [facility]


   Last Admin: 01/06/19 09:39 Dose:  10 mg


Methylprednisolone (Solu-Medrol)  40 mg IVP Q8H [facility]


   Last Admin: 01/06/19 22:51 Dose:  40 mg


Sitagliptin Phosphate (Januvia)  50 mg PO DAILY [facility]


   Last Admin: 01/06/19 09:39 Dose:  50 mg


Trazodone HCl (Desyrel)  100 mg PO HS [facility]


   Last Admin: 01/06/19 21:37 Dose:  100 mg











- Labs


Labs: 


                                        





                                 01/05/19 05:13 





                                 01/06/19 08:06 





                                        











PT  15.1 SECONDS (9.7-12.2)  H  01/04/19  20:33    


 


INR  1.4   01/04/19  20:33    


 


APTT  47 SECONDS (21-34)  H  01/04/19  20:33    














Assessment and Plan


(1) Pneumonia


Status: Acute   





(2) Fever


Status: Acute   





(3) Asthma dependent on inhaled steroids


Status: Acute   





(4) Diabetes mellitus


Status: Acute

## 2019-01-07 LAB
ALBUMIN SERPL-MCNC: 3.4 G/DL (ref 3.5–5)
ALBUMIN/GLOB SERPL: 1.2 {RATIO} (ref 1–2.1)
ALT SERPL-CCNC: 30 U/L (ref 9–52)
AST SERPL-CCNC: 26 U/L (ref 14–36)
BUN SERPL-MCNC: 22 MG/DL (ref 7–17)
CALCIUM SERPL-MCNC: 8.9 MG/DL (ref 8.6–10.4)
GFR NON-AFRICAN AMERICAN: > 60

## 2019-01-07 RX ADMIN — HUMAN INSULIN SCH UNIT: 100 INJECTION, SOLUTION SUBCUTANEOUS at 14:20

## 2019-01-07 RX ADMIN — IPRATROPIUM BROMIDE AND ALBUTEROL SULFATE SCH ML: .5; 3 SOLUTION RESPIRATORY (INHALATION) at 16:49

## 2019-01-07 RX ADMIN — HUMAN INSULIN SCH UNIT: 100 INJECTION, SOLUTION SUBCUTANEOUS at 08:28

## 2019-01-07 RX ADMIN — HUMAN INSULIN SCH UNIT: 100 INJECTION, SOLUTION SUBCUTANEOUS at 16:36

## 2019-01-07 RX ADMIN — IPRATROPIUM BROMIDE AND ALBUTEROL SULFATE SCH: .5; 3 SOLUTION RESPIRATORY (INHALATION) at 11:06

## 2019-01-07 RX ADMIN — IPRATROPIUM BROMIDE AND ALBUTEROL SULFATE SCH: .5; 3 SOLUTION RESPIRATORY (INHALATION) at 04:11

## 2019-01-07 RX ADMIN — IPRATROPIUM BROMIDE AND ALBUTEROL SULFATE SCH ML: .5; 3 SOLUTION RESPIRATORY (INHALATION) at 00:36

## 2019-01-07 RX ADMIN — IPRATROPIUM BROMIDE AND ALBUTEROL SULFATE SCH ML: .5; 3 SOLUTION RESPIRATORY (INHALATION) at 07:49

## 2019-01-07 RX ADMIN — IPRATROPIUM BROMIDE AND ALBUTEROL SULFATE SCH ML: .5; 3 SOLUTION RESPIRATORY (INHALATION) at 19:52

## 2019-01-07 RX ADMIN — CEFTRIAXONE SCH MLS/HR: 1 INJECTION, SOLUTION INTRAVENOUS at 21:24

## 2019-01-07 RX ADMIN — CEFTRIAXONE SCH MLS/HR: 1 INJECTION, SOLUTION INTRAVENOUS at 10:14

## 2019-01-07 RX ADMIN — METHYLPREDNISOLONE SODIUM SUCCINATE SCH MG: 40 INJECTION, POWDER, FOR SOLUTION INTRAMUSCULAR; INTRAVENOUS at 06:31

## 2019-01-07 RX ADMIN — METHYLPREDNISOLONE SODIUM SUCCINATE SCH MG: 40 INJECTION, POWDER, FOR SOLUTION INTRAMUSCULAR; INTRAVENOUS at 15:36

## 2019-01-07 RX ADMIN — METHYLPREDNISOLONE SODIUM SUCCINATE SCH MG: 40 INJECTION, POWDER, FOR SOLUTION INTRAMUSCULAR; INTRAVENOUS at 23:51

## 2019-01-07 RX ADMIN — HUMAN INSULIN SCH UNIT: 100 INJECTION, SOLUTION SUBCUTANEOUS at 21:54

## 2019-01-07 NOTE — CARD
--------------- APPROVED REPORT --------------





Date of service: 01/04/2019



EKG Measurement

Heart [id number]

GA [id number]

[id number]

[id number]

[id number]



<Conclusion>

Normal sinus rhythm

Right atrial enlargement

Left bundle branch block

Abnormal ECG

## 2019-01-07 NOTE — CP.PCM.PN
Subjective





- Date & Time of Evaluation


Date of Evaluation: 01/07/19


Time of Evaluation: 18:20





- Subjective


Subjective: 





CHIEF COMPLAINTS TODAY :


AFEBRILE,


FEELING SLIGHTLY BETTER.


LESS WHEEZING





ROS.


HEENT :  N.


Resp :       No  hemoptysis +VE COUGH 


Cardio :     No anginal  CP, PND, orthopnea, palpitation 


GI :           No abd.pain, n/v ,diarrhea or GI bleeding .


CNS : No headache, vertigo, focal deficit.


Musculoskel :  No joint swelling ,


Derm :        No rash 


Psych :     Normal affect.


Ext :  No  swelling ,calf pain 





PE.


Pt. is alert awake in no distress.


V.S  As noted in the chart 


Head ,ear nose,throat and eyes : Normal.


Neck : Supple with normal carotids.


Lungs: B/L EXPIRATORY WHEEZE / RHONCHI .


Heart : S1 & S2 normal with S4. No murmur.


Abd : Soft non tender with normal bowel sounds.


Neuro : Moves all ext. with no localized deficit.


Ext : No edema with intact pulses.Non tender calves 


Derm : No rashes or decubitus ulcer.





LABS/RADIOLOGY:


reviewed.


WBC 16.1


ESR 48





ASSESSMENT/PLAN : 


Continue IV antibiotic 


on nebulizer and steroids.


f/u CULTURES TO ADJUST ABX.





Objective





- Vital Signs/Intake and Output


Vital Signs (last 24 hours): 


                                        











Temp Pulse Resp BP Pulse Ox


 


 97.7 F   59 L  20   119/57 L  96 


 


 01/07/19 16:00  01/07/19 16:00  01/07/19 16:00  01/07/19 16:00  01/07/19 16:00








Intake and Output: 


                                        











 01/07/19 01/07/19





 06:59 18:59


 


Intake Total 500 300


 


Balance 500 300














- Medications


Medications: 


                               Current Medications





Albuterol/Ipratropium (Duoneb 3 Mg/0.5 Mg (3 Ml) Ud)  3 ml INH RQ4 PRN


   PRN Reason: Shortness of Breath


   Last Admin: 01/05/19 12:37 Dose:  3 ml


Albuterol/Ipratropium (Duoneb 3 Mg/0.5 Mg (3 Ml) Ud)  3 ml INH RQ4 [facility]


   Last Admin: 01/07/19 16:49 Dose:  3 ml


Heparin Sodium (Porcine) (Heparin)  5,000 units SC Q12 [facility]


   Last Admin: 01/07/19 10:10 Dose:  5,000 units


Azithromycin 500 mg/ Sodium (Chloride)  250 mls @ 250 mls/hr IVPB DAILY [facility]; 

Protocol


   Last Admin: 01/07/19 10:19 Dose:  250 mls/hr


Ceftriaxone Sodium (Rocephin Iv 1 Gm Duplex)  50 mls @ 100 mls/hr IVPB Q12H [facility];

Protocol


   Last Admin: 01/07/19 10:14 Dose:  100 mls/hr


Insulin Human Regular (Novolin R)  0 unit SC ACHS [facility]; Protocol


   Last Admin: 01/07/19 16:36 Dose:  3 unit


Lisinopril (Zestril)  10 mg PO DAILY [facility]


   Last Admin: 01/07/19 10:07 Dose:  10 mg


Methylprednisolone (Solu-Medrol)  40 mg IVP Q8H [facility]


   Last Admin: 01/07/19 15:36 Dose:  40 mg


Sitagliptin Phosphate (Januvia)  50 mg PO DAILY [facility]


   Last Admin: 01/07/19 10:07 Dose:  50 mg


Trazodone HCl (Desyrel)  100 mg PO HS [facility]


   Last Admin: 01/06/19 21:37 Dose:  100 mg











- Labs


Labs: 


                                        





                                 01/05/19 05:13 





                                 01/07/19 07:53 





                                        











PT  15.1 SECONDS (9.7-12.2)  H  01/04/19  20:33    


 


INR  1.4   01/04/19  20:33    


 


APTT  47 SECONDS (21-34)  H  01/04/19  20:33    














Assessment and Plan


(1) Pneumonia


Status: Acute   





(2) Fever


Status: Acute   





(3) Asthma dependent on inhaled steroids


Status: Acute   





(4) Diabetes mellitus


Status: Acute

## 2019-01-07 NOTE — CP.PCM.PN
Subjective





- Date & Time of Evaluation


Date of Evaluation: 01/07/19


Time of Evaluation: 13:55





- Subjective


Subjective: 





CHIEF COMPLAINTS TODAY :


Patient is coughing and wheezing with white thick expectoration





ROS.


HEENT :  N.


Resp :       No  hemoptysis 


Cardio :     No anginal  CP, PND, orthopnea, palpitation 


GI :           No abd.pain, n/v ,diarrhea or GI bleeding .


CNS : No headache, vertigo, focal deficit.


Musculoskel :  No joint swelling ,


Derm :        No rash 


Psych :     Normal affect.


Ext :  No  swelling ,calf pain 





PE.


Pt. is alert awake in no distress.


V.S  As noted in the chart 


Head ,ear nose,throat and eyes : Normal.


Neck : Supple with normal carotids.


Lungs: Bilateral basal crackles poor air entry with rhonchi


Heart : S1 & S2 normal with S4. No murmur.


Abd : Soft non tender with normal bowel sounds.


Neuro : Moves all ext. with no localized deficit.


Ext : No edema with intact pulses.Non tender calves 


Derm : No rashes or decubitus ulcer.





LABS/RADIOLOGY:





ASSESSMENT/PLAN : 


Continue IV antibiotic nebulizer and steroids





Objective





- Vital Signs/Intake and Output


Vital Signs (last 24 hours): 


                                        











Temp Pulse Resp BP Pulse Ox


 


 98 F   60   20   119/60   97 


 


 01/07/19 07:43  01/07/19 07:43  01/07/19 07:43  01/07/19 07:43  01/07/19 07:43








Intake and Output: 


                                        











 01/07/19 01/07/19





 11:59 23:59


 


Intake Total 300 


 


Balance 300 














- Medications


Medications: 


                               Current Medications





Albuterol/Ipratropium (Duoneb 3 Mg/0.5 Mg (3 Ml) Ud)  3 ml INH RQ4 PRN


   PRN Reason: Shortness of Breath


   Last Admin: 01/05/19 12:37 Dose:  3 ml


Albuterol/Ipratropium (Duoneb 3 Mg/0.5 Mg (3 Ml) Ud)  3 ml INH RQ4 [facility]


   Last Admin: 01/07/19 11:06 Dose:  Not Given


Heparin Sodium (Porcine) (Heparin)  5,000 units SC Q12 [facility]


   Last Admin: 01/07/19 10:10 Dose:  5,000 units


Azithromycin 500 mg/ Sodium (Chloride)  250 mls @ 250 mls/hr IVPB DAILY [facility]; 

Protocol


   Last Admin: 01/07/19 10:19 Dose:  250 mls/hr


Ceftriaxone Sodium (Rocephin Iv 1 Gm Duplex)  50 mls @ 100 mls/hr IVPB Q12H [facility];

Protocol


   Last Admin: 01/07/19 10:14 Dose:  100 mls/hr


Insulin Human Regular (Novolin R)  0 unit SC ACHS [facility]; Protocol


   Last Admin: 01/07/19 08:28 Dose:  4 unit


Lisinopril (Zestril)  10 mg PO DAILY [facility]


   Last Admin: 01/07/19 10:07 Dose:  10 mg


Methylprednisolone (Solu-Medrol)  40 mg IVP Q8H [facility]


   Last Admin: 01/07/19 06:31 Dose:  40 mg


Sitagliptin Phosphate (Januvia)  50 mg PO DAILY [facility]


   Last Admin: 01/07/19 10:07 Dose:  50 mg


Trazodone HCl (Desyrel)  100 mg PO HS [facility]


   Last Admin: 01/06/19 21:37 Dose:  100 mg











- Labs


Labs: 


                                        





                                 01/05/19 05:13 





                                 01/07/19 07:53 





                                        











PT  15.1 SECONDS (9.7-12.2)  H  01/04/19  20:33    


 


INR  1.4   01/04/19  20:33    


 


APTT  47 SECONDS (21-34)  H  01/04/19  20:33    














Assessment and Plan


(1) COPD (chronic obstructive pulmonary disease) with acute bronchitis


Status: Acute   





(2) Pneumonia


Status: Acute

## 2019-01-08 LAB
ALBUMIN SERPL-MCNC: 3.3 G/DL (ref 3.5–5)
ALBUMIN/GLOB SERPL: 1.2 {RATIO} (ref 1–2.1)
ALT SERPL-CCNC: 34 U/L (ref 9–52)
AST SERPL-CCNC: 30 U/L (ref 14–36)
BUN SERPL-MCNC: 18 MG/DL (ref 7–17)
CALCIUM SERPL-MCNC: 8.7 MG/DL (ref 8.6–10.4)
GFR NON-AFRICAN AMERICAN: > 60

## 2019-01-08 RX ADMIN — CEFTRIAXONE SCH MLS/HR: 1 INJECTION, SOLUTION INTRAVENOUS at 21:34

## 2019-01-08 RX ADMIN — IPRATROPIUM BROMIDE AND ALBUTEROL SULFATE SCH ML: .5; 3 SOLUTION RESPIRATORY (INHALATION) at 16:11

## 2019-01-08 RX ADMIN — IPRATROPIUM BROMIDE AND ALBUTEROL SULFATE SCH ML: .5; 3 SOLUTION RESPIRATORY (INHALATION) at 11:39

## 2019-01-08 RX ADMIN — HUMAN INSULIN SCH UNIT: 100 INJECTION, SOLUTION SUBCUTANEOUS at 17:10

## 2019-01-08 RX ADMIN — HUMAN INSULIN SCH UNIT: 100 INJECTION, SOLUTION SUBCUTANEOUS at 12:30

## 2019-01-08 RX ADMIN — METHYLPREDNISOLONE SODIUM SUCCINATE SCH MG: 40 INJECTION, POWDER, FOR SOLUTION INTRAMUSCULAR; INTRAVENOUS at 16:19

## 2019-01-08 RX ADMIN — IPRATROPIUM BROMIDE AND ALBUTEROL SULFATE SCH ML: .5; 3 SOLUTION RESPIRATORY (INHALATION) at 00:15

## 2019-01-08 RX ADMIN — HUMAN INSULIN SCH UNIT: 100 INJECTION, SOLUTION SUBCUTANEOUS at 08:30

## 2019-01-08 RX ADMIN — IPRATROPIUM BROMIDE AND ALBUTEROL SULFATE SCH: .5; 3 SOLUTION RESPIRATORY (INHALATION) at 08:39

## 2019-01-08 RX ADMIN — IPRATROPIUM BROMIDE AND ALBUTEROL SULFATE SCH ML: .5; 3 SOLUTION RESPIRATORY (INHALATION) at 20:11

## 2019-01-08 RX ADMIN — METHYLPREDNISOLONE SODIUM SUCCINATE SCH MG: 40 INJECTION, POWDER, FOR SOLUTION INTRAMUSCULAR; INTRAVENOUS at 08:00

## 2019-01-08 RX ADMIN — CEFTRIAXONE SCH MLS/HR: 1 INJECTION, SOLUTION INTRAVENOUS at 09:33

## 2019-01-08 RX ADMIN — IPRATROPIUM BROMIDE AND ALBUTEROL SULFATE SCH: .5; 3 SOLUTION RESPIRATORY (INHALATION) at 04:00

## 2019-01-08 RX ADMIN — HUMAN INSULIN SCH: 100 INJECTION, SOLUTION SUBCUTANEOUS at 21:45

## 2019-01-08 NOTE — CP.PCM.PN
Subjective





- Date & Time of Evaluation


Date of Evaluation: 01/08/19


Time of Evaluation: 11:41





- Subjective


Subjective: 





CHIEF COMPLAINTS TODAY :


Patient is coughing and wheezing with white thick expectoration





ROS.


HEENT :  N.


Resp :       No  hemoptysis 


Cardio :     No anginal  CP, PND, orthopnea, palpitation 


GI :           No abd.pain, n/v ,diarrhea or GI bleeding .


CNS : No headache, vertigo, focal deficit.


Musculoskel :  No joint swelling ,


Derm :        No rash 


Psych :     Normal affect.


Ext :  No  swelling ,calf pain 





PE.


Pt. is alert awake in no distress.


V.S  As noted in the chart 


Head ,ear nose,throat and eyes : Normal.


Neck : Supple with normal carotids.


Lungs: Bilateral basal crackles poor air entry with rhonchi


Heart : S1 & S2 normal with S4. No murmur.


Abd : Soft non tender with normal bowel sounds.


Neuro : Moves all ext. with no localized deficit.


Ext : No edema with intact pulses.Non tender calves 


Derm : No rashes or decubitus ulcer.





LABS/RADIOLOGY:





ASSESSMENT/PLAN : 


Continue IV antibiotic nebulizer and steroids





Objective





- Vital Signs/Intake and Output


Vital Signs (last 24 hours): 


                                        











Temp Pulse Resp BP Pulse Ox


 


 98.2 F   61   20   130/73   96 


 


 01/08/19 08:08  01/08/19 08:08  01/08/19 08:08  01/08/19 08:08  01/08/19 08:08








Intake and Output: 


                                        











 01/07/19 01/08/19





 23:59 11:59


 


Intake Total 400 10


 


Balance 400 10














- Medications


Medications: 


                               Current Medications





Albuterol/Ipratropium (Duoneb 3 Mg/0.5 Mg (3 Ml) Ud)  3 ml INH RQ4 PRN


   PRN Reason: Shortness of Breath


   Last Admin: 01/05/19 12:37 Dose:  3 ml


Albuterol/Ipratropium (Duoneb 3 Mg/0.5 Mg (3 Ml) Ud)  3 ml INH RQ4 [facility]


   Last Admin: 01/08/19 11:39 Dose:  3 ml


Heparin Sodium (Porcine) (Heparin)  5,000 units SC Q12 [facility]


   Last Admin: 01/08/19 09:33 Dose:  5,000 units


Azithromycin 500 mg/ Sodium (Chloride)  250 mls @ 250 mls/hr IVPB DAILY [facility]; 

Protocol


   Last Admin: 01/08/19 10:42 Dose:  250 mls/hr


Ceftriaxone Sodium (Rocephin Iv 1 Gm Duplex)  50 mls @ 100 mls/hr IVPB Q12H [facility];

Protocol


   Last Admin: 01/08/19 09:33 Dose:  100 mls/hr


Insulin Human Regular (Novolin R)  0 unit SC ACHS [facility]; Protocol


   Last Admin: 01/08/19 08:30 Dose:  4 unit


Lisinopril (Zestril)  10 mg PO DAILY [facility]


   Last Admin: 01/08/19 09:33 Dose:  10 mg


Methylprednisolone (Solu-Medrol)  40 mg IVP Q8H [facility]


   Last Admin: 01/08/19 08:00 Dose:  40 mg


Sitagliptin Phosphate (Januvia)  50 mg PO DAILY [facility]


   Last Admin: 01/08/19 09:33 Dose:  50 mg


Trazodone HCl (Desyrel)  100 mg PO HS [facility]


   Last Admin: 01/07/19 21:30 Dose:  100 mg











- Labs


Labs: 


                                        





                                 01/05/19 05:13 





                                 01/08/19 06:35 





                                        











PT  15.1 SECONDS (9.7-12.2)  H  01/04/19  20:33    


 


INR  1.4   01/04/19  20:33    


 


APTT  47 SECONDS (21-34)  H  01/04/19  20:33    














Assessment and Plan


(1) COPD (chronic obstructive pulmonary disease) with acute bronchitis


Status: Acute   





(2) Pneumonia


Status: Acute

## 2019-01-09 LAB
ALBUMIN SERPL-MCNC: 3.4 G/DL (ref 3.5–5)
ALBUMIN/GLOB SERPL: 1.3 {RATIO} (ref 1–2.1)
ALT SERPL-CCNC: 56 U/L (ref 9–52)
AST SERPL-CCNC: 42 U/L (ref 14–36)
BUN SERPL-MCNC: 20 MG/DL (ref 7–17)
CALCIUM SERPL-MCNC: 8.9 MG/DL (ref 8.6–10.4)
GFR NON-AFRICAN AMERICAN: > 60

## 2019-01-09 RX ADMIN — HUMAN INSULIN SCH UNIT: 100 INJECTION, SOLUTION SUBCUTANEOUS at 08:30

## 2019-01-09 RX ADMIN — HUMAN INSULIN SCH UNIT: 100 INJECTION, SOLUTION SUBCUTANEOUS at 12:27

## 2019-01-09 RX ADMIN — IPRATROPIUM BROMIDE AND ALBUTEROL SULFATE SCH ML: .5; 3 SOLUTION RESPIRATORY (INHALATION) at 16:47

## 2019-01-09 RX ADMIN — METHYLPREDNISOLONE SODIUM SUCCINATE SCH MG: 40 INJECTION, POWDER, FOR SOLUTION INTRAMUSCULAR; INTRAVENOUS at 00:19

## 2019-01-09 RX ADMIN — CEFTRIAXONE SCH MLS/HR: 1 INJECTION, SOLUTION INTRAVENOUS at 09:37

## 2019-01-09 RX ADMIN — IPRATROPIUM BROMIDE AND ALBUTEROL SULFATE SCH ML: .5; 3 SOLUTION RESPIRATORY (INHALATION) at 21:01

## 2019-01-09 RX ADMIN — METHYLPREDNISOLONE SODIUM SUCCINATE SCH MG: 40 INJECTION, POWDER, FOR SOLUTION INTRAMUSCULAR; INTRAVENOUS at 08:30

## 2019-01-09 RX ADMIN — IPRATROPIUM BROMIDE AND ALBUTEROL SULFATE SCH: .5; 3 SOLUTION RESPIRATORY (INHALATION) at 04:30

## 2019-01-09 RX ADMIN — METHYLPREDNISOLONE SODIUM SUCCINATE SCH MG: 40 INJECTION, POWDER, FOR SOLUTION INTRAMUSCULAR; INTRAVENOUS at 16:30

## 2019-01-09 RX ADMIN — IPRATROPIUM BROMIDE AND ALBUTEROL SULFATE SCH ML: .5; 3 SOLUTION RESPIRATORY (INHALATION) at 08:50

## 2019-01-09 RX ADMIN — HUMAN INSULIN SCH UNIT: 100 INJECTION, SOLUTION SUBCUTANEOUS at 17:09

## 2019-01-09 RX ADMIN — CEFTRIAXONE SCH MLS/HR: 1 INJECTION, SOLUTION INTRAVENOUS at 21:10

## 2019-01-09 RX ADMIN — HUMAN INSULIN SCH UNIT: 100 INJECTION, SOLUTION SUBCUTANEOUS at 22:05

## 2019-01-09 RX ADMIN — IPRATROPIUM BROMIDE AND ALBUTEROL SULFATE SCH ML: .5; 3 SOLUTION RESPIRATORY (INHALATION) at 12:24

## 2019-01-09 RX ADMIN — IPRATROPIUM BROMIDE AND ALBUTEROL SULFATE SCH ML: .5; 3 SOLUTION RESPIRATORY (INHALATION) at 00:30

## 2019-01-09 NOTE — CP.PCM.DIS
Provider





- Provider


Date of Admission: 


01/04/19 23:20





Attending physician: 


Malachi Pena MD





Consults: 








01/05/19 14:32


Infectious Disease Consult Routine 


   Comment: 


   Consulting Provider: Selene Javier


   Consulting Physician: Selene Javier


   Reason for Consult: MATA PNEUMONIA











Time Spent in preparation of Discharge (in minutes): 32





Diagnosis





- Discharge Diagnosis


(1) COPD (chronic obstructive pulmonary disease) with acute bronchitis


Status: Acute   





(2) Pneumonia


Status: Acute   





Hospital Course





- Lab Results


Lab Results: 


                                  Micro Results





01/04/19 21:00   Blood   Blood Culture - Preliminary


                            NO GROWTH AFTER 4 DAYS


01/04/19 20:30   Blood   Blood Culture - Preliminary


                            NO GROWTH AFTER 4 DAYS


01/06/19 10:02   Sputum   Gram Stain - Final


01/06/19 10:02   Sputum   Sputum Culture - Final


                            NORMAL ORAL JOSE


01/05/19 21:03   Nose   MRSA Culture (Admit) - Final


                            MRSA NOT DETECTED





                             Most Recent Lab Values











WBC  16.1 K/uL (4.8-10.8)  H  01/05/19  05:13    


 


RBC  5.07 Mil/uL (3.80-5.20)   01/05/19  05:13    


 


Hgb  14.5 g/dL (11.0-16.0)   01/05/19  05:13    


 


Hct  44.6 % (34.0-47.0)   01/05/19  05:13    


 


MCV  88.0 fL (81.0-99.0)   01/05/19  05:13    


 


MCH  28.6 pg (27.0-31.0)   01/05/19  05:13    


 


MCHC  32.5 g/dL (33.0-37.0)  L  01/05/19  05:13    


 


RDW  13.5 % (11.5-14.5)   01/05/19  05:13    


 


Plt Count  180 K/uL (130-400)   01/05/19  05:13    


 


MPV  9.6 fL (7.2-11.7)   01/05/19  05:13    


 


Neut % (Auto)  95.1 % (50.0-75.0)  H  01/05/19  05:13    


 


Lymph % (Auto)  3.6 % (20.0-40.0)  L  01/05/19  05:13    


 


Mono % (Auto)  0.9 % (0.0-10.0)   01/05/19  05:13    


 


Eos % (Auto)  0.2 % (0.0-4.0)   01/05/19  05:13    


 


Baso % (Auto)  0.2 % (0.0-2.0)   01/05/19  05:13    


 


Neut # (Auto)  15.4 K/uL (1.8-7.0)  H  01/05/19  05:13    


 


Lymph # (Auto)  0.6 K/uL (1.0-4.3)  L  01/05/19  05:13    


 


Mono # (Auto)  0.1 K/uL (0.0-0.8)   01/05/19  05:13    


 


Eos # (Auto)  0.0 K/uL (0.0-0.7)   01/05/19  05:13    


 


Baso # (Auto)  0.0 K/uL (0.0-0.2)   01/05/19  05:13    


 


Neutrophils % (Manual)  87 % (50-75)  H  01/05/19  05:13    


 


Band Neutrophils %  8 % (0-2)  H  01/05/19  05:13    


 


Lymphocytes % (Manual)  3 % (20-40)  L  01/05/19  05:13    


 


Reactive Lymphs %  1 % (0-0)  H  01/05/19  05:13    


 


Monocytes % (Manual)  1 % (0-10)   01/05/19  05:13    


 


Platelet Estimate  Normal  (NORMAL)   01/05/19  05:13    


 


Large Platelets  Present   01/05/19  05:13    


 


Harjeet Cells  Slight   01/05/19  05:13    


 


ESR  48 mm/hr (0-20)  H  01/06/19  08:06    


 


PT  15.1 SECONDS (9.7-12.2)  H  01/04/19  20:33    


 


INR  1.4   01/04/19  20:33    


 


APTT  47 SECONDS (21-34)  H  01/04/19  20:33    


 


pO2  47 mm/Hg (30-55)   01/04/19  20:38    


 


VBG pH  7.38  (7.32-7.43)   01/04/19  20:38    


 


VBG pCO2  30 mmHg (40-60)  L  01/04/19  20:38    


 


VBG HCO3  19.7 mmol/L  01/04/19  20:38    


 


VBG Total CO2  18.6 mmol/L (22-28)  L  01/04/19  20:38    


 


VBG O2 Sat (Calc)  91.2 % (40-65)  H  01/04/19  20:38    


 


VBG Base Excess  -6.2 mmol/L (0.0-2.0)  L  01/04/19  20:38    


 


VBG Potassium  2.4 mmol/L (3.6-5.2)  L*  01/04/19  20:38    


 


A-a O2 Difference  65.0 mm/Hg  01/04/19  20:38    


 


Sodium  143.0 mmol/l (132-148)   01/04/19  20:38    


 


Chloride  114.0 mmol/L ()  H  01/04/19  20:38    


 


Glucose  116 mg/dl ()  H  01/04/19  20:38    


 


Lactate  1.0 mmol/L (0.7-2.1)   01/04/19  20:38    


 


FiO2  21.0 %  01/04/19  20:38    


 


Crit Value Called To  Sapira   01/04/19  20:38    


 


Crit Value Called By     01/04/19  20:38    


 


Crit Value Read Back  Y   01/04/19  20:38    


 


Blood Gas Notified Time  2041 01/04/19  20:38    


 


Sodium  136 mmol/L (132-148)   01/09/19  07:02    


 


Potassium  4.9 mmol/L (3.6-5.2)   01/09/19  07:02    


 


Chloride  97 mmol/L ()  L  01/09/19  07:02    


 


Carbon Dioxide  33 mmol/L (22-30)  H  01/09/19  07:02    


 


Anion Gap  11  (10-20)   01/09/19  07:02    


 


BUN  20 mg/dL (7-17)  H  01/09/19  07:02    


 


Creatinine  0.7 mg/dL (0.7-1.2)   01/09/19  07:02    


 


Est GFR ( Amer)  > 60   01/09/19  07:02    


 


Est GFR (Non-Af Amer)  > 60   01/09/19  07:02    


 


POC Glucose (mg/dL)  262 mg/dL ()  H  01/09/19  11:39    


 


Random Glucose  288 mg/dL ()  H  01/09/19  07:02    


 


Calcium  8.9 mg/dl (8.6-10.4)   01/09/19  07:02    


 


Magnesium  2.4 mg/dL (1.6-2.3)  H  01/09/19  07:02    


 


Total Bilirubin  0.4 mg/dL (0.2-1.3)   01/09/19  07:02    


 


AST  42 U/L (14-36)  H D 01/09/19  07:02    


 


ALT  56 U/L (9-52)  H D 01/09/19  07:02    


 


Alkaline Phosphatase  94 U/L ()   01/09/19  07:02    


 


Troponin I  < 0.0120 ng/mL (0.00-0.120)   01/04/19  20:33    


 


C-React Prot High Sens  > 15.00 mg/L (1.00-3.00)  H  01/06/19  08:06    


 


NT-Pro-B Natriuret Pep  258 pg/mL (0-900)   01/04/19  20:33    


 


Total Protein  6.1 g/dL (6.3-8.3)  L  01/09/19  07:02    


 


Albumin  3.4 g/dL (3.5-5.0)  L  01/09/19  07:02    


 


Globulin  2.7 gm/dL (2.2-3.9)   01/09/19  07:02    


 


Albumin/Globulin Ratio  1.3  (1.0-2.1)   01/09/19  07:02    


 


Venous Blood Potassium  2.4 mmol/L (3.6-5.2)  L*  01/04/19  20:38    


 


Urine Color  Yellow  (YELLOW)   01/05/19  14:50    


 


Urine Clarity  Clear  (Clear)   01/05/19  14:50    


 


Urine pH  5.0  (5.0-8.0)   01/05/19  14:50    


 


Ur Specific Gravity  1.025  (1.003-1.030)   01/05/19  14:50    


 


Urine Protein  Negative mg/dL (NEGATIVE)   01/05/19  14:50    


 


Urine Glucose (UA)  3+ mg/dL (Normal)  H  01/05/19  14:50    


 


Urine Ketones  1+ mg/dL (NEGATIVE)  H  01/05/19  14:50    


 


Urine Blood  Negative  (NEGATIVE)   01/05/19  14:50    


 


Urine Nitrate  Negative  (NEGATIVE)   01/05/19  14:50    


 


Urine Bilirubin  Negative  (NEGATIVE)   01/05/19  14:50    


 


Urine Urobilinogen  Normal mg/dL (0.2-1.0)   01/05/19  14:50    


 


Ur Leukocyte Esterase  Neg Peace/uL (Negative)   01/05/19  14:50    


 


Urine WBC (Auto)  1 /hpf (0-5)   01/05/19  14:50    


 


Urine RBC (Auto)  < 1 /hpf (0-3)   01/05/19  14:50    


 


Influenza Typ A,B (EIA)  Negative for flu a/b  (NEGATIVE)   01/04/19  20:33    


 


Mycoplasma pneumon IgM  Negative  (NEGATIVE)   01/06/19  08:06    














- Hospital Course


Hospital Course: 





63 year old female presents to the ED c/o fever, cough, congestion and dyspnea 

on exertion for the past 2-3 days. Patient also reports having decreased 

appetite able to speak in complete sentences. Patient states she still smokes 

3/4 pack a day





Patient was treated with IV steroids antibiotics and nebulizer.  Patient 

improved on above therapy repeat chest x-ray showed resolution of the pneumonia.

 Septic workup was negative.


Patient will be sent on by mouth antibiotics prednisone and inhaler.


Patient was strongly recommended to discontinue smoking





1/10/19


pt was d/c next day due to family issue 





Discharge Exam





- Head Exam


Head Exam: NORMAL INSPECTION





Discharge Plan





- Discharge Medications


Prescriptions: 


SITagliptin [Januvia] 50 mg PO DAILY 30 Days  tab


Methylprednisolone [Medrol Dose Pack (21 tabs)] 4 mg PO DAILY #21 mg


Azithromycin [Z-John] 250 mg PO DAILY #6 tab


Lisinopril [Zestril] 10 mg PO DAILY 30 Days  tab





- Follow Up Plan


Condition: FAIR


Disposition: HOME/ ROUTINE


Instructions:  Sitagliptin, Azithromycin (Systemic), Asthma, Adult (DC), 

Exacerbation of COPD (DC), Lisinopril, Methylprednisolone, Community-Acquired 

Pneumonia, Adult (DC)


Additional Instructions: 


FOLLOW UP WITH DR PENA  IN 1-2 WEEKS -----CALL FOR APPOINTMENT


FOLLOW UP WITH DR JAVIER IN HER OFFICE ----CALL FOR APPOINTMENT


CONTINUE HOME MEDICATION 


NEW PRESCRIPTION GIVEN


   Z PACK PO DAILY AS DIRECTED


   LISINOPRIL 10 MG PO DAILY 


   MEDROL DOSE PACK PO AS DIRECTED


   JANUVIA 50 MG PO DAILY 


ACTIVITY AS TOLERATED 


CALL DR PENA OR GO TO THE EMERGENCY ROOM IF SYMPTOM RETURN OR WORSENING


Referrals: 


Selene Javier MD [Staff Provider] - 


Malachi Pena MD [Staff Provider] -

## 2019-01-09 NOTE — CP.PCM.PN
Subjective





- Date & Time of Evaluation


Date of Evaluation: 01/09/19


Time of Evaluation: 12:12





- Subjective


Subjective: 





CHIEF COMPLAINTS TODAY :


AFEBRILE,


LESS COUGH


LESS WHEEZING


FEELING BETTER.


CXR 1/9/19- DONE  NO ACUTE INFILTRATES





ROS.


HEENT :  N.


Resp :       No  hemoptysis +VE COUGH 


Cardio :     No anginal  CP, PND, orthopnea, palpitation 


GI :           No abd.pain, n/v ,diarrhea or GI bleeding .


CNS : No headache, vertigo, focal deficit.


Musculoskel :  No joint swelling ,


Derm :        No rash 


Psych :     Normal affect.


Ext :  No  swelling ,calf pain 





PE.


Pt. is alert awake in no distress.


V.S  As noted in the chart 


Head ,ear nose,throat and eyes : Normal.


Neck : Supple with normal carotids.


Lungs: B/L EXPIRATORY WHEEZE / RHONCHI .


Heart : S1 & S2 normal with S4. No murmur.


Abd : Soft non tender with normal bowel sounds.


Neuro : Moves all ext. with no localized deficit.


Ext : No edema with intact pulses.Non tender calves 


Derm : No rashes or decubitus ulcer.





LABS/RADIOLOGY:


reviewed.


SPUTUM -N. JOSE


WBC 16.1


ESR 48





ASSESSMENT/PLAN : 


CAN SWITCH TO PO  Z-PACK ON DISCHARGE IN AM.


REPEAT CBC W DIFF


LFTS /BMP 


on nebulizer 


steroids taper off as per PMD.


CPM,











Objective





- Vital Signs/Intake and Output


Vital Signs (last 24 hours): 


                                        











Temp Pulse Resp BP Pulse Ox


 


 97.8 F   64   20   131/65   97 


 


 01/09/19 08:13  01/09/19 08:13  01/09/19 08:13  01/09/19 08:13  01/09/19 08:13








Intake and Output: 


                                        











 01/09/19 01/09/19





 06:59 18:59


 


Intake Total 500 300


 


Balance 500 300














- Medications


Medications: 


                               Current Medications





Albuterol/Ipratropium (Duoneb 3 Mg/0.5 Mg (3 Ml) Ud)  3 ml INH RQ4 PRN


   PRN Reason: Shortness of Breath


   Last Admin: 01/05/19 12:37 Dose:  3 ml


Albuterol/Ipratropium (Duoneb 3 Mg/0.5 Mg (3 Ml) Ud)  3 ml INH RQ4 [facility]


   Last Admin: 01/09/19 08:50 Dose:  3 ml


Heparin Sodium (Porcine) (Heparin)  5,000 units SC Q12 [facility]


   Last Admin: 01/09/19 09:38 Dose:  5,000 units


Azithromycin 500 mg/ Sodium (Chloride)  250 mls @ 250 mls/hr IVPB DAILY [facility]; 

Protocol


   Last Admin: 01/09/19 10:06 Dose:  250 mls/hr


Ceftriaxone Sodium (Rocephin Iv 1 Gm Duplex)  50 mls @ 100 mls/hr IVPB Q12H [facility];

Protocol


   Last Admin: 01/09/19 09:37 Dose:  100 mls/hr


Insulin Human Regular (Novolin R)  0 unit SC ACHS [facility]; Protocol


   Last Admin: 01/09/19 08:30 Dose:  6 unit


Lisinopril (Zestril)  10 mg PO DAILY [facility]


   Last Admin: 01/09/19 09:38 Dose:  10 mg


Methylprednisolone (Solu-Medrol)  40 mg IVP Q8H [facility]


   Last Admin: 01/09/19 08:30 Dose:  40 mg


Sitagliptin Phosphate (Januvia)  50 mg PO DAILY [facility]


   Last Admin: 01/09/19 09:38 Dose:  50 mg


Trazodone HCl (Desyrel)  100 mg PO HS [facility]


   Last Admin: 01/08/19 21:41 Dose:  100 mg











- Labs


Labs: 


                                        





                                 01/05/19 05:13 





                                 01/09/19 07:02 





                                        











PT  15.1 SECONDS (9.7-12.2)  H  01/04/19  20:33    


 


INR  1.4   01/04/19  20:33    


 


APTT  47 SECONDS (21-34)  H  01/04/19  20:33    














Assessment and Plan


(1) Pneumonia


Status: Acute   





(2) Fever


Status: Acute   





(3) Asthma dependent on inhaled steroids


Status: Acute   





(4) Diabetes mellitus


Status: Acute

## 2019-01-09 NOTE — RAD
Date of service: 



01/09/2019



HISTORY:

 pneumonia 



COMPARISON:

Portable chest 01/04/2019.



FINDINGS:



LUNGS:

No active pulmonary disease.



PLEURA:

No significant pleural effusion identified, no pneumothorax apparent.



CARDIOVASCULAR:

No aortic atherosclerotic calcification present.



 Normal cardiac size. No pulmonary vascular congestion. 



OSSEOUS STRUCTURES:

No significant abnormalities.



VISUALIZED UPPER ABDOMEN:

No significant abnormality.



OTHER FINDINGS:

None.



IMPRESSION:

No acute cardiopulmonary disease appreciable including bilateral 

bases.

## 2019-01-10 VITALS
SYSTOLIC BLOOD PRESSURE: 157 MMHG | DIASTOLIC BLOOD PRESSURE: 76 MMHG | HEART RATE: 58 BPM | TEMPERATURE: 98 F | OXYGEN SATURATION: 96 %

## 2019-01-10 LAB
ALBUMIN SERPL-MCNC: 3.5 G/DL (ref 3.5–5)
ALBUMIN/GLOB SERPL: 1.3 {RATIO} (ref 1–2.1)
ALT SERPL-CCNC: 57 U/L (ref 9–52)
AST SERPL-CCNC: 31 U/L (ref 14–36)
BASOPHILS # BLD AUTO: 0 K/UL (ref 0–0.2)
BASOPHILS NFR BLD: 0.2 % (ref 0–2)
BILIRUB DIRECT SERPL-MCNC: 0.4 MG/DL (ref 0–0.4)
BUN SERPL-MCNC: 18 MG/DL (ref 7–17)
CALCIUM SERPL-MCNC: 8.9 MG/DL (ref 8.6–10.4)
EOSINOPHIL # BLD AUTO: 0 K/UL (ref 0–0.7)
EOSINOPHIL NFR BLD: 0.1 % (ref 0–4)
ERYTHROCYTE [DISTWIDTH] IN BLOOD BY AUTOMATED COUNT: 13.4 % (ref 11.5–14.5)
GFR NON-AFRICAN AMERICAN: > 60
HGB BLD-MCNC: 14 G/DL (ref 11–16)
LYMPHOCYTE: 6 % (ref 20–40)
LYMPHOCYTES # BLD AUTO: 0.4 K/UL (ref 1–4.3)
LYMPHOCYTES NFR BLD AUTO: 5.3 % (ref 20–40)
MCH RBC QN AUTO: 29.2 PG (ref 27–31)
MCHC RBC AUTO-ENTMCNC: 33.1 G/DL (ref 33–37)
MCV RBC AUTO: 88.2 FL (ref 81–99)
MONOCYTE: 4 % (ref 0–10)
MONOCYTES # BLD: 0.2 K/UL (ref 0–0.8)
MONOCYTES NFR BLD: 3.1 % (ref 0–10)
MYELOCYTES NFR BLD: 1 % (ref 0–0)
NEUTROPHILS # BLD: 6.3 K/UL (ref 1.8–7)
NEUTROPHILS NFR BLD AUTO: 83 % (ref 50–75)
NEUTROPHILS NFR BLD AUTO: 91.3 % (ref 50–75)
NEUTS BAND NFR BLD: 3 % (ref 0–2)
NRBC BLD AUTO-RTO: 0 % (ref 0–2)
PLATELET # BLD EST: NORMAL 10*3/UL
PLATELET # BLD: 164 K/UL (ref 130–400)
PMV BLD AUTO: 9.6 FL (ref 7.2–11.7)
RBC # BLD AUTO: 4.8 MIL/UL (ref 3.8–5.2)
TOTAL CELLS COUNTED BLD: 100
VARIANT LYMPHS NFR BLD MANUAL: 3 % (ref 0–0)
WBC # BLD AUTO: 6.9 K/UL (ref 4.8–10.8)

## 2019-01-10 RX ADMIN — METHYLPREDNISOLONE SODIUM SUCCINATE SCH MG: 40 INJECTION, POWDER, FOR SOLUTION INTRAMUSCULAR; INTRAVENOUS at 07:05

## 2019-01-10 RX ADMIN — IPRATROPIUM BROMIDE AND ALBUTEROL SULFATE SCH: .5; 3 SOLUTION RESPIRATORY (INHALATION) at 04:05

## 2019-01-10 RX ADMIN — IPRATROPIUM BROMIDE AND ALBUTEROL SULFATE SCH ML: .5; 3 SOLUTION RESPIRATORY (INHALATION) at 11:58

## 2019-01-10 RX ADMIN — CEFTRIAXONE SCH MLS/HR: 1 INJECTION, SOLUTION INTRAVENOUS at 09:29

## 2019-01-10 RX ADMIN — HUMAN INSULIN SCH UNIT: 100 INJECTION, SOLUTION SUBCUTANEOUS at 06:46

## 2019-01-10 RX ADMIN — IPRATROPIUM BROMIDE AND ALBUTEROL SULFATE SCH ML: .5; 3 SOLUTION RESPIRATORY (INHALATION) at 08:14

## 2019-01-10 RX ADMIN — IPRATROPIUM BROMIDE AND ALBUTEROL SULFATE SCH ML: .5; 3 SOLUTION RESPIRATORY (INHALATION) at 00:45

## 2019-01-10 RX ADMIN — METHYLPREDNISOLONE SODIUM SUCCINATE SCH MG: 40 INJECTION, POWDER, FOR SOLUTION INTRAMUSCULAR; INTRAVENOUS at 00:31

## 2019-01-10 RX ADMIN — HUMAN INSULIN SCH UNIT: 100 INJECTION, SOLUTION SUBCUTANEOUS at 12:27

## 2019-01-10 NOTE — CP.PCM.PN
Subjective





- Date & Time of Evaluation


Date of Evaluation: 01/10/19


Time of Evaluation: 11:21





- Subjective


Subjective: 





CHIEF COMPLAINTS TODAY :


AFEBRILE,


FEELING BETTER.





CXR 1/9/19- DONE  NO ACUTE INFILTRATES





ROS.


HEENT :  N.


Resp :       No  hemoptysis +VE COUGH 


Cardio :     No anginal  CP, PND, orthopnea, palpitation 


GI :           No abd.pain, n/v ,diarrhea or GI bleeding .


CNS : No headache, vertigo, focal deficit.


Musculoskel :  No joint swelling ,


Derm :        No rash 


Psych :     Normal affect.


Ext :  No  swelling ,calf pain 





PE.


Pt. is alert awake in no distress.


V.S  As noted in the chart 


Head ,ear nose,throat and eyes : Normal.


Neck : Supple with normal carotids.


Lungs: B/L EXPIRATORY WHEEZE / RHONCHI .


Heart : S1 & S2 normal with S4. No murmur.


Abd : Soft non tender with normal bowel sounds.


Neuro : Moves all ext. with no localized deficit.


Ext : No edema with intact pulses.Non tender calves 


Derm : No rashes or decubitus ulcer.





LABS/RADIOLOGY:


reviewed.


wbc 6.1 


SPUTUM -N. JOSE


ESR 48





ASSESSMENT/PLAN : 


CAN SWITCH TO PO  Z-PACK ON DISCHARGE .


F/U AS OPD WITH PMD


F/U LFTS CLOSELY AT PMDS OFFICE.


on nebulizer


TAPER OFF STEROID


AS PER PMD 





Objective





- Vital Signs/Intake and Output


Vital Signs (last 24 hours): 


                                        











Temp Pulse Resp BP Pulse Ox


 


 98 F   58 L  20   157/76 H  96 


 


 01/10/19 07:19  01/10/19 07:19  01/10/19 07:19  01/10/19 07:19  01/10/19 07:19








Intake and Output: 


                                        











 01/10/19 01/10/19





 06:59 18:59


 


Intake Total 450 


 


Balance 450 














- Medications


Medications: 


                               Current Medications





Albuterol/Ipratropium (Duoneb 3 Mg/0.5 Mg (3 Ml) Ud)  3 ml INH RQ4 PRN


   PRN Reason: Shortness of Breath


   Last Admin: 01/05/19 12:37 Dose:  3 ml


Albuterol/Ipratropium (Duoneb 3 Mg/0.5 Mg (3 Ml) Ud)  3 ml INH RQ4 [facility]


   Last Admin: 01/10/19 08:14 Dose:  3 ml


Heparin Sodium (Porcine) (Heparin)  5,000 units SC Q12 [facility]


   Last Admin: 01/10/19 09:14 Dose:  5,000 units


Azithromycin 500 mg/ Sodium (Chloride)  250 mls @ 250 mls/hr IVPB DAILY [facility]; 

Protocol


   Last Admin: 01/10/19 10:00 Dose:  250 mls/hr


Ceftriaxone Sodium (Rocephin Iv 1 Gm Duplex)  50 mls @ 100 mls/hr IVPB Q12H [facility];

Protocol


   Last Admin: 01/10/19 09:29 Dose:  100 mls/hr


Insulin Human Regular (Novolin R)  0 unit SC ACHS [facility]; Protocol


   Last Admin: 01/10/19 06:46 Dose:  6 unit


Lisinopril (Zestril)  10 mg PO DAILY [facility]


   Last Admin: 01/10/19 09:13 Dose:  10 mg


Methylprednisolone (Solu-Medrol)  40 mg IVP Q8H [facility]


   Last Admin: 01/10/19 07:05 Dose:  40 mg


Sitagliptin Phosphate (Januvia)  50 mg PO DAILY [facility]


   Last Admin: 01/10/19 09:13 Dose:  50 mg


Trazodone HCl (Desyrel)  100 mg PO HS [facility]


   Last Admin: 01/09/19 22:05 Dose:  100 mg











- Labs


Labs: 


                                        





                                 01/10/19 06:59 





                                 01/10/19 06:59 





                                        











PT  15.1 SECONDS (9.7-12.2)  H  01/04/19  20:33    


 


INR  1.4   01/04/19  20:33    


 


APTT  47 SECONDS (21-34)  H  01/04/19  20:33    














Assessment and Plan


(1) Pneumonia


Status: Acute   





(2) Fever


Status: Acute   





(3) Asthma dependent on inhaled steroids


Status: Acute   





(4) Diabetes mellitus


Status: Acute

## 2019-01-10 NOTE — CP.PCM.PN
Subjective





- Date & Time of Evaluation


Date of Evaluation: 01/10/19


Time of Evaluation: 12:50





Objective





- Vital Signs/Intake and Output


Vital Signs (last 24 hours): 


                                        











Temp Pulse Resp BP Pulse Ox


 


 98 F   58 L  20   157/76 H  96 


 


 01/10/19 07:19  01/10/19 07:19  01/10/19 07:19  01/10/19 07:19  01/10/19 07:19








Intake and Output: 


                                        











 01/10/19 01/10/19





 06:59 18:59


 


Intake Total 450 


 


Balance 450 














- Medications


Medications: 


                               Current Medications





Albuterol/Ipratropium (Duoneb 3 Mg/0.5 Mg (3 Ml) Ud)  3 ml INH RQ4 PRN


   PRN Reason: Shortness of Breath


   Last Admin: 01/05/19 12:37 Dose:  3 ml


Albuterol/Ipratropium (Duoneb 3 Mg/0.5 Mg (3 Ml) Ud)  3 ml INH RQ4 [facility]


   Last Admin: 01/10/19 08:14 Dose:  3 ml


Heparin Sodium (Porcine) (Heparin)  5,000 units SC Q12 [facility]


   Last Admin: 01/10/19 09:14 Dose:  5,000 units


Azithromycin 500 mg/ Sodium (Chloride)  250 mls @ 250 mls/hr IVPB DAILY [facility]; 

Protocol


   Last Admin: 01/10/19 10:00 Dose:  250 mls/hr


Ceftriaxone Sodium (Rocephin Iv 1 Gm Duplex)  50 mls @ 100 mls/hr IVPB Q12H [facility];

Protocol


   Last Admin: 01/10/19 09:29 Dose:  100 mls/hr


Insulin Human Regular (Novolin R)  0 unit SC ACHS [facility]; Protocol


   Last Admin: 01/10/19 12:27 Dose:  6 unit


Lisinopril (Zestril)  10 mg PO DAILY [facility]


   Last Admin: 01/10/19 09:13 Dose:  10 mg


Methylprednisolone (Solu-Medrol)  40 mg IVP Q12 [facility]


Sitagliptin Phosphate (Januvia)  50 mg PO DAILY [facility]


   Last Admin: 01/10/19 09:13 Dose:  50 mg


Trazodone HCl (Desyrel)  100 mg PO HS [facility]


   Last Admin: 01/09/19 22:05 Dose:  100 mg











- Labs


Labs: 


                                        





                                 01/10/19 06:59 





                                 01/10/19 06:59 





                                        











PT  15.1 SECONDS (9.7-12.2)  H  01/04/19  20:33    


 


INR  1.4   01/04/19  20:33    


 


APTT  47 SECONDS (21-34)  H  01/04/19  20:33    














Assessment and Plan





- Assessment and Plan (Free Text)


Assessment: 





FOLLOW UP WITH DR CANO  IN 1-2 WEEKS -----CALL FOR APPOINTMENT


FOLLOW UP WITH DR RYAN IN HER OFFICE ----CALL FOR APPOINTMENT


CONTINUE HOME MEDICATION 


NEW PRESCRIPTION GIVEN


   Z PACK PO DAILY AS DIRECTED


   LISINOPRIL 10 MG PO DAILY 


   MEDROL DOSE PACK PO AS DIRECTED


   JANUVIA 50 MG PO DAILY 


ACTIVITY AS TOLERATED 


CALL DR CANO OR GO TO THE EMERGENCY ROOM IF SYMPTOM RETURN OR WORSENING